# Patient Record
Sex: MALE | Race: BLACK OR AFRICAN AMERICAN | NOT HISPANIC OR LATINO
[De-identification: names, ages, dates, MRNs, and addresses within clinical notes are randomized per-mention and may not be internally consistent; named-entity substitution may affect disease eponyms.]

---

## 2019-10-09 PROBLEM — Z00.00 ENCOUNTER FOR PREVENTIVE HEALTH EXAMINATION: Status: ACTIVE | Noted: 2019-10-09

## 2019-10-17 ENCOUNTER — NON-APPOINTMENT (OUTPATIENT)
Age: 59
End: 2019-10-17

## 2019-10-17 ENCOUNTER — APPOINTMENT (OUTPATIENT)
Dept: CARDIOLOGY | Facility: CLINIC | Age: 59
End: 2019-10-17
Payer: COMMERCIAL

## 2019-10-17 VITALS
HEIGHT: 68 IN | HEART RATE: 84 BPM | BODY MASS INDEX: 29.7 KG/M2 | OXYGEN SATURATION: 97 % | DIASTOLIC BLOOD PRESSURE: 70 MMHG | WEIGHT: 196 LBS | SYSTOLIC BLOOD PRESSURE: 118 MMHG | RESPIRATION RATE: 16 BRPM

## 2019-10-17 DIAGNOSIS — I10 ESSENTIAL (PRIMARY) HYPERTENSION: ICD-10-CM

## 2019-10-17 DIAGNOSIS — R06.00 DYSPNEA, UNSPECIFIED: ICD-10-CM

## 2019-10-17 DIAGNOSIS — R07.9 CHEST PAIN, UNSPECIFIED: ICD-10-CM

## 2019-10-17 DIAGNOSIS — E78.5 HYPERLIPIDEMIA, UNSPECIFIED: ICD-10-CM

## 2019-10-17 DIAGNOSIS — Z82.49 FAMILY HISTORY OF ISCHEMIC HEART DISEASE AND OTHER DISEASES OF THE CIRCULATORY SYSTEM: ICD-10-CM

## 2019-10-17 DIAGNOSIS — Z78.9 OTHER SPECIFIED HEALTH STATUS: ICD-10-CM

## 2019-10-17 PROCEDURE — 93000 ELECTROCARDIOGRAM COMPLETE: CPT

## 2019-10-17 PROCEDURE — 99204 OFFICE O/P NEW MOD 45 MIN: CPT

## 2019-10-17 RX ORDER — TAMSULOSIN HYDROCHLORIDE 0.4 MG/1
0.4 CAPSULE ORAL TWICE DAILY
Refills: 3 | Status: ACTIVE | COMMUNITY

## 2019-10-17 RX ORDER — ASPIRIN 81 MG
81 TABLET, DELAYED RELEASE (ENTERIC COATED) ORAL
Refills: 0 | Status: ACTIVE | COMMUNITY

## 2019-10-17 RX ORDER — AMLODIPINE BESYLATE 10 MG/1
10 TABLET ORAL
Refills: 0 | Status: ACTIVE | COMMUNITY

## 2019-10-17 RX ORDER — MULTIVIT-MIN/FA/LYCOPEN/LUTEIN .4-300-25
TABLET ORAL
Refills: 0 | Status: ACTIVE | COMMUNITY

## 2019-10-17 RX ORDER — PRAVASTATIN SODIUM 40 MG/1
40 TABLET ORAL
Refills: 0 | Status: ACTIVE | COMMUNITY

## 2019-10-17 RX ORDER — UBIDECARENONE/VIT E ACET 100MG-5
CAPSULE ORAL
Refills: 0 | Status: ACTIVE | COMMUNITY

## 2019-10-17 NOTE — PHYSICAL EXAM
[General Appearance - Well Developed] : well developed [General Appearance - Well Nourished] : well nourished [Normal Conjunctiva] : the conjunctiva exhibited no abnormalities [Normal Oropharynx] : normal oropharynx [Normal Jugular Venous V Waves Present] : normal jugular venous V waves present [Heart Rate And Rhythm] : heart rate and rhythm were normal [Heart Sounds] : normal S1 and S2 [Murmurs] : no murmurs present [] : no respiratory distress [Respiration, Rhythm And Depth] : normal respiratory rhythm and effort [Auscultation Breath Sounds / Voice Sounds] : lungs were clear to auscultation bilaterally [Bowel Sounds] : normal bowel sounds [Abdomen Soft] : soft [Abdomen Tenderness] : non-tender [Abnormal Walk] : normal gait [Nail Clubbing] : no clubbing of the fingernails [Cyanosis, Localized] : no localized cyanosis [Skin Turgor] : normal skin turgor [Skin Color & Pigmentation] : normal skin color and pigmentation [Oriented To Time, Place, And Person] : oriented to person, place, and time [Affect] : the affect was normal [FreeTextEntry1] : no edema

## 2019-10-17 NOTE — HISTORY OF PRESENT ILLNESS
[FreeTextEntry1] : Patient is a 60yo M with HTN, HLD, family h/o CAD here for cardiac evaluation. Seen cardiology few years back, now changed due to insurance. NOt , has grown kids. Live with family. Patient denies PND/orthopnea/edema/palpitations/syncope/claudication. Will get sharp chest pain, drinks water and deep breaths and gets better. COmes sporadically. Lasts seconds. Will get SOB going up stairs sometimes. \par \par ROS: GI negative, all others negative

## 2019-10-17 NOTE — DISCUSSION/SUMMARY
[FreeTextEntry1] : Patient is a 58yo M with HTN, HLD, family h/o CAD here for cardiac evaluation. Has some atypical CP and mild dypsnea on exertion. Exam benign. ECG with minor abnormalities. WIll obtain BW to review lipids, BP controlled. Will arrange ischemic work up\par \par 1. ECho and nuclear stress test to evaluate dypsnea/CP in setting of family history/HTN/HLD \par 2. Continue current antihypertensives, blood pressure is well controlled \par 3. Obtain lipids, continue statin. CAn continue ASA\par 4. Follow up after testing

## 2019-11-20 ENCOUNTER — INPATIENT (INPATIENT)
Facility: HOSPITAL | Age: 59
LOS: 1 days | Discharge: HOME | DRG: 690 | End: 2019-11-22
Attending: GENERAL ACUTE CARE HOSPITAL | Admitting: HOSPITALIST
Payer: COMMERCIAL

## 2019-11-20 VITALS
HEART RATE: 96 BPM | TEMPERATURE: 99 F | SYSTOLIC BLOOD PRESSURE: 118 MMHG | HEIGHT: 68 IN | WEIGHT: 194.01 LBS | RESPIRATION RATE: 18 BRPM | DIASTOLIC BLOOD PRESSURE: 80 MMHG | OXYGEN SATURATION: 98 %

## 2019-11-20 DIAGNOSIS — A49.9 BACTERIAL INFECTION, UNSPECIFIED: ICD-10-CM

## 2019-11-20 LAB
ALBUMIN SERPL ELPH-MCNC: 3.4 G/DL — SIGNIFICANT CHANGE UP (ref 3.3–5.2)
ALP SERPL-CCNC: 111 U/L — SIGNIFICANT CHANGE UP (ref 40–120)
ALT FLD-CCNC: 46 U/L — HIGH
ANION GAP SERPL CALC-SCNC: 11 MMOL/L — SIGNIFICANT CHANGE UP (ref 5–17)
APPEARANCE UR: CLEAR — SIGNIFICANT CHANGE UP
AST SERPL-CCNC: 38 U/L — SIGNIFICANT CHANGE UP
BACTERIA # UR AUTO: NEGATIVE — SIGNIFICANT CHANGE UP
BASOPHILS # BLD AUTO: 0 K/UL — SIGNIFICANT CHANGE UP (ref 0–0.2)
BASOPHILS NFR BLD AUTO: 0 % — SIGNIFICANT CHANGE UP (ref 0–2)
BILIRUB SERPL-MCNC: 1 MG/DL — SIGNIFICANT CHANGE UP (ref 0.4–2)
BILIRUB UR-MCNC: NEGATIVE — SIGNIFICANT CHANGE UP
BUN SERPL-MCNC: 15 MG/DL — SIGNIFICANT CHANGE UP (ref 8–20)
CALCIUM SERPL-MCNC: 9 MG/DL — SIGNIFICANT CHANGE UP (ref 8.6–10.2)
CHLORIDE SERPL-SCNC: 97 MMOL/L — LOW (ref 98–107)
CO2 SERPL-SCNC: 25 MMOL/L — SIGNIFICANT CHANGE UP (ref 22–29)
COLOR SPEC: YELLOW — SIGNIFICANT CHANGE UP
CREAT SERPL-MCNC: 0.87 MG/DL — SIGNIFICANT CHANGE UP (ref 0.5–1.3)
DIFF PNL FLD: ABNORMAL
EOSINOPHIL # BLD AUTO: 0.13 K/UL — SIGNIFICANT CHANGE UP (ref 0–0.5)
EOSINOPHIL NFR BLD AUTO: 0.9 % — SIGNIFICANT CHANGE UP (ref 0–6)
EPI CELLS # UR: SIGNIFICANT CHANGE UP
GLUCOSE SERPL-MCNC: 114 MG/DL — SIGNIFICANT CHANGE UP (ref 70–115)
GLUCOSE UR QL: NEGATIVE MG/DL — SIGNIFICANT CHANGE UP
HCT VFR BLD CALC: 33.4 % — LOW (ref 39–50)
HGB BLD-MCNC: 10.3 G/DL — LOW (ref 13–17)
KETONES UR-MCNC: ABNORMAL
LEUKOCYTE ESTERASE UR-ACNC: ABNORMAL
LYMPHOCYTES # BLD AUTO: 0.97 K/UL — LOW (ref 1–3.3)
LYMPHOCYTES # BLD AUTO: 6.9 % — LOW (ref 13–44)
MCHC RBC-ENTMCNC: 25.8 PG — LOW (ref 27–34)
MCHC RBC-ENTMCNC: 30.8 GM/DL — LOW (ref 32–36)
MCV RBC AUTO: 83.7 FL — SIGNIFICANT CHANGE UP (ref 80–100)
MONOCYTES # BLD AUTO: 0.6 K/UL — SIGNIFICANT CHANGE UP (ref 0–0.9)
MONOCYTES NFR BLD AUTO: 4.3 % — SIGNIFICANT CHANGE UP (ref 2–14)
NEUTROPHILS # BLD AUTO: 12.34 K/UL — HIGH (ref 1.8–7.4)
NEUTROPHILS NFR BLD AUTO: 87 % — HIGH (ref 43–77)
NITRITE UR-MCNC: NEGATIVE — SIGNIFICANT CHANGE UP
PH UR: 6 — SIGNIFICANT CHANGE UP (ref 5–8)
PLATELET # BLD AUTO: 266 K/UL — SIGNIFICANT CHANGE UP (ref 150–400)
POTASSIUM SERPL-MCNC: 4.2 MMOL/L — SIGNIFICANT CHANGE UP (ref 3.5–5.3)
POTASSIUM SERPL-SCNC: 4.2 MMOL/L — SIGNIFICANT CHANGE UP (ref 3.5–5.3)
PROT SERPL-MCNC: 7 G/DL — SIGNIFICANT CHANGE UP (ref 6.6–8.7)
PROT UR-MCNC: 30 MG/DL
RBC # BLD: 3.99 M/UL — LOW (ref 4.2–5.8)
RBC # FLD: 14.1 % — SIGNIFICANT CHANGE UP (ref 10.3–14.5)
RBC CASTS # UR COMP ASSIST: SIGNIFICANT CHANGE UP /HPF (ref 0–4)
SODIUM SERPL-SCNC: 133 MMOL/L — LOW (ref 135–145)
SP GR SPEC: 1.02 — SIGNIFICANT CHANGE UP (ref 1.01–1.02)
UROBILINOGEN FLD QL: NEGATIVE MG/DL — SIGNIFICANT CHANGE UP
WBC # BLD: 14.04 K/UL — HIGH (ref 3.8–10.5)
WBC # FLD AUTO: 14.04 K/UL — HIGH (ref 3.8–10.5)
WBC UR QL: SIGNIFICANT CHANGE UP

## 2019-11-20 PROCEDURE — 99284 EMERGENCY DEPT VISIT MOD MDM: CPT

## 2019-11-20 PROCEDURE — 76775 US EXAM ABDO BACK WALL LIM: CPT | Mod: 26

## 2019-11-20 PROCEDURE — 99222 1ST HOSP IP/OBS MODERATE 55: CPT

## 2019-11-20 RX ORDER — TAMSULOSIN HYDROCHLORIDE 0.4 MG/1
1 CAPSULE ORAL
Qty: 0 | Refills: 0 | DISCHARGE

## 2019-11-20 RX ORDER — MEROPENEM 1 G/30ML
1000 INJECTION INTRAVENOUS EVERY 8 HOURS
Refills: 0 | Status: DISCONTINUED | OUTPATIENT
Start: 2019-11-20 | End: 2019-11-21

## 2019-11-20 RX ORDER — AMLODIPINE BESYLATE 2.5 MG/1
10 TABLET ORAL DAILY
Refills: 0 | Status: DISCONTINUED | OUTPATIENT
Start: 2019-11-20 | End: 2019-11-22

## 2019-11-20 RX ORDER — ACETAMINOPHEN 500 MG
650 TABLET ORAL EVERY 6 HOURS
Refills: 0 | Status: DISCONTINUED | OUTPATIENT
Start: 2019-11-20 | End: 2019-11-22

## 2019-11-20 RX ORDER — ATORVASTATIN CALCIUM 80 MG/1
10 TABLET, FILM COATED ORAL AT BEDTIME
Refills: 0 | Status: DISCONTINUED | OUTPATIENT
Start: 2019-11-20 | End: 2019-11-22

## 2019-11-20 RX ORDER — MEROPENEM 1 G/30ML
1000 INJECTION INTRAVENOUS ONCE
Refills: 0 | Status: COMPLETED | OUTPATIENT
Start: 2019-11-20 | End: 2019-11-20

## 2019-11-20 RX ORDER — TAMSULOSIN HYDROCHLORIDE 0.4 MG/1
0.4 CAPSULE ORAL AT BEDTIME
Refills: 0 | Status: DISCONTINUED | OUTPATIENT
Start: 2019-11-20 | End: 2019-11-22

## 2019-11-20 RX ORDER — MEROPENEM 1 G/30ML
1 INJECTION INTRAVENOUS ONCE
Refills: 0 | Status: DISCONTINUED | OUTPATIENT
Start: 2019-11-20 | End: 2019-11-20

## 2019-11-20 RX ORDER — AMLODIPINE BESYLATE 2.5 MG/1
1 TABLET ORAL
Qty: 0 | Refills: 0 | DISCHARGE

## 2019-11-20 RX ADMIN — MEROPENEM 100 MILLIGRAM(S): 1 INJECTION INTRAVENOUS at 18:48

## 2019-11-20 NOTE — ED PROVIDER NOTE - NS ED ROS FT
Constitutional: positive fevers, no sweats, and no chills.   Genitourinary: Positive dysuria, intermittent retention, no hematuria, no flank pain, no pelvis tenderness   Gastrointestinal: No bloating, no constipation   Cardiac: No chest pain, no palpitations   Respiratory: No shortness of breath Constitutional: positive fevers, no sweats, and no chills.   Genitourinary: Positive dysuria, intermittent retention, no hematuria, no flank pain, no pelvis tenderness   Gastrointestinal: No bloating, no constipation   Cardiac: No chest pain, no palpitations   Respiratory: No shortness of breath, no dyspnea

## 2019-11-20 NOTE — H&P ADULT - ASSESSMENT
59yoM hx HTN, HLD, BPH recently diagnosed with UTI at urgent care center, started on ciprofloxacin, sent to ED after urine culture revealed EBSL    ESBL UTI  -Urine culture from urgent care center located on patient’s cell phone  -Admit to any medical bed  -UA negative, but likely sterile in setting of recent quinolone use  -Received meropenem in ED, will continue meropenem   -Urine cultures pending  -Tylenol PRN  -Pt will need PICC placement, case management consult to facilitate IV abx infusions at home     Leukocytosis  -WBC 14, likely due to UTI  -Trend CBC    Normocytic anemia  -Check iron studies, vit B12, and folic acid in AM  -Monitor CBC    HTN  -Amlodipine resumed    HLD  -Statin resumed    BPH  -Tamsulosin resumed     Prophylactic measure  -Intermittent pneumatic compression

## 2019-11-20 NOTE — H&P ADULT - HISTORY OF PRESENT ILLNESS
59yoM hx HTN, HLD, BPH who initially presented with intermittent urinary retention and urinary incontinence associated with subjective fevers and chills beginning last week.  His symptoms persisted, then he began to have to have urinary frequency and urgency which prompted him to go to an urgent care center where he was diagnosed with UTI and started on ciprofloxacin.  He received a call by the urgent care center earlier today advising him to go to the hospital after his urine culture was positive for ESBL.  Pt has a copy for the urine culture report on his cellphone which shows 60K ESBL. Pt reports that his urinary retention symptoms have been improving.  While in ED, he had a renal US that was negative for hydronephrosis.

## 2019-11-20 NOTE — ED STATDOCS - PROGRESS NOTE DETAILS
60 y/o M pt with hx of BPH presents to ED c/o urinary incontinence and increased urgency. Reports incontinence over the past few days. Presented to urgent care 2 days ago with 100.8 F for same complaint; had urine culture collected. Today received a phone call and was told to present to ED for IV abx due to + culture for ESBL. Patient has copy of results. No pain. No bowel incontinence. No N/V/D. will be transferred to [facility].

## 2019-11-20 NOTE — ED PROVIDER NOTE - OBJECTIVE STATEMENT
59 year old male who presents to the ED with intermittent urinary incontinence and retention. Patient stated last week he began to have a fever that he initially thought was the flu. The fever went away and returned this past monday associated with urinary frequency, urgency and inability to fully empty his bladder. 59 year old male who presents to the ED with intermittent urinary incontinence and retention. Patient stated last week he began to have a fever that he initially thought was the flu. The fever went away and returned this past Monday 100.0 F he did not take anything for but had associated symptoms of urinary frequency, urgency and inability to fully empty his bladder. Patient went to urgent care for his symptoms and they told him he had a UTI and put him on 500mg ciprofloxacin BID. Patient has been taking antibiotic as instructed and received a phone call today from urgent care "to go to the ED for fluids because he was dehydrated". Patient reports as of today the urinary frequency is subsiding but now he does not feel as if he is fully emptying his bladder. Denies fever since Monday. Results from urgent care show urine culture positive for ESBL E.Coli

## 2019-11-20 NOTE — ED STATDOCS - NS ED ROS FT
No fever/chills, No photophobia/eye pain/changes in vision, No ear pain/sore throat/dysphagia, No chest pain/palpitations, no SOB/cough/wheeze/stridor, No abdominal pain, No N/V/D, + urinary incontinence. no dysuria/discharge, No neck/back pain, no rash, no changes in neurological status/function.

## 2019-11-20 NOTE — ED PROVIDER NOTE - CLINICAL SUMMARY MEDICAL DECISION MAKING FREE TEXT BOX
Patient presents to ED with 3 days of dysuria and urinary frequency. Patient has results from urgent care with ECOLI ESBL urine culture. Will obtain urine cultures, labs, renal and bladder ultrasound. Will require IV antibiotics

## 2019-11-20 NOTE — ED STATDOCS - OBJECTIVE STATEMENT
58 y/o M pt with hx of BPH presents to ED c/o urinary incontinence and increased urgency. Reports incontinence over the past few days. Presented to urgent care 2 days ago for same complaint; today received a phone call and was told to present to ED for "fluids". No pain. No bowel incontinence. No N/V/D. No further complaints at this time.

## 2019-11-20 NOTE — H&P ADULT - NSHPLABSRESULTS_GEN_ALL_CORE
10.3   14.04 )-----------( 266      ( 20 Nov 2019 18:39 )             33.4       11-20    133<L>  |  97<L>  |  15.0  ----------------------------<  114  4.2   |  25.0  |  0.87    Ca    9.0      20 Nov 2019 18:39    TPro  7.0  /  Alb  3.4  /  TBili  1.0  /  DBili  x   /  AST  38  /  ALT  46<H>  /  AlkPhos  111  11-20

## 2019-11-20 NOTE — H&P ADULT - NSICDXPASTMEDICALHX_GEN_ALL_CORE_FT
PAST MEDICAL HISTORY:  Benign prostatic hyperplasia without lower urinary tract symptoms     High cholesterol     Hypertension, unspecified type

## 2019-11-20 NOTE — H&P ADULT - NSHPPHYSICALEXAM_GEN_ALL_CORE
Vital Signs Last 24 Hrs  T(C): 37 (20 Nov 2019 19:53), Max: 37.1 (20 Nov 2019 15:08)  T(F): 98.6 (20 Nov 2019 19:53), Max: 98.7 (20 Nov 2019 15:08)  HR: 92 (20 Nov 2019 19:53) (92 - 96)  BP: 123/78 (20 Nov 2019 19:53) (118/80 - 123/78)  BP(mean): --  RR: 18 (20 Nov 2019 19:53) (18 - 18)  SpO2: 96% (20 Nov 2019 19:53) (96% - 98%)    GENERAL:  Well-appearing, not in acute distress  EYES:  Clear conjunctiva, extraocular movement intact  ENT: Moist mucous membranes  RESP:  Non-labored breathing pattern, lungs clear to ausculation in anterior fields  CV: Regular rate and rhythm, no murmurs appreciated, no lower extremity edema  GI: Soft, non-tender, non-distended  NEURO: Awake, alert, conversant, non-focal  PSYCH: Calm, cooperative  SKIN: No rash or lesions, warm and dry

## 2019-11-20 NOTE — ED PROVIDER NOTE - PMH
Benign prostatic hyperplasia without lower urinary tract symptoms    High cholesterol    Hypertension, unspecified type

## 2019-11-21 LAB
ANION GAP SERPL CALC-SCNC: 12 MMOL/L — SIGNIFICANT CHANGE UP (ref 5–17)
BASOPHILS # BLD AUTO: 0.01 K/UL — SIGNIFICANT CHANGE UP (ref 0–0.2)
BASOPHILS NFR BLD AUTO: 0.1 % — SIGNIFICANT CHANGE UP (ref 0–2)
BUN SERPL-MCNC: 14 MG/DL — SIGNIFICANT CHANGE UP (ref 8–20)
CALCIUM SERPL-MCNC: 8.5 MG/DL — LOW (ref 8.6–10.2)
CHLORIDE SERPL-SCNC: 104 MMOL/L — SIGNIFICANT CHANGE UP (ref 98–107)
CO2 SERPL-SCNC: 23 MMOL/L — SIGNIFICANT CHANGE UP (ref 22–29)
CREAT SERPL-MCNC: 0.82 MG/DL — SIGNIFICANT CHANGE UP (ref 0.5–1.3)
CULTURE RESULTS: NO GROWTH — SIGNIFICANT CHANGE UP
EOSINOPHIL # BLD AUTO: 0.03 K/UL — SIGNIFICANT CHANGE UP (ref 0–0.5)
EOSINOPHIL NFR BLD AUTO: 0.3 % — SIGNIFICANT CHANGE UP (ref 0–6)
FERRITIN SERPL-MCNC: 346 NG/ML — SIGNIFICANT CHANGE UP (ref 30–400)
FOLATE SERPL-MCNC: 16.4 NG/ML — SIGNIFICANT CHANGE UP
GLUCOSE SERPL-MCNC: 98 MG/DL — SIGNIFICANT CHANGE UP (ref 70–115)
HCT VFR BLD CALC: 33 % — LOW (ref 39–50)
HCV AB S/CO SERPL IA: 0.11 S/CO — SIGNIFICANT CHANGE UP (ref 0–0.99)
HCV AB SERPL-IMP: SIGNIFICANT CHANGE UP
HGB BLD-MCNC: 10.5 G/DL — LOW (ref 13–17)
IMM GRANULOCYTES NFR BLD AUTO: 1.9 % — HIGH (ref 0–1.5)
IRON SATN MFR SERPL: 21 % — SIGNIFICANT CHANGE UP (ref 16–55)
IRON SATN MFR SERPL: 38 UG/DL — LOW (ref 59–158)
LYMPHOCYTES # BLD AUTO: 0.9 K/UL — LOW (ref 1–3.3)
LYMPHOCYTES # BLD AUTO: 10.2 % — LOW (ref 13–44)
MCHC RBC-ENTMCNC: 26.4 PG — LOW (ref 27–34)
MCHC RBC-ENTMCNC: 31.8 GM/DL — LOW (ref 32–36)
MCV RBC AUTO: 82.9 FL — SIGNIFICANT CHANGE UP (ref 80–100)
MONOCYTES # BLD AUTO: 1.02 K/UL — HIGH (ref 0–0.9)
MONOCYTES NFR BLD AUTO: 11.6 % — SIGNIFICANT CHANGE UP (ref 2–14)
NEUTROPHILS # BLD AUTO: 6.69 K/UL — SIGNIFICANT CHANGE UP (ref 1.8–7.4)
NEUTROPHILS NFR BLD AUTO: 75.9 % — SIGNIFICANT CHANGE UP (ref 43–77)
PLATELET # BLD AUTO: 260 K/UL — SIGNIFICANT CHANGE UP (ref 150–400)
POTASSIUM SERPL-MCNC: 4 MMOL/L — SIGNIFICANT CHANGE UP (ref 3.5–5.3)
POTASSIUM SERPL-SCNC: 4 MMOL/L — SIGNIFICANT CHANGE UP (ref 3.5–5.3)
RBC # BLD: 3.98 M/UL — LOW (ref 4.2–5.8)
RBC # FLD: 13.9 % — SIGNIFICANT CHANGE UP (ref 10.3–14.5)
SODIUM SERPL-SCNC: 139 MMOL/L — SIGNIFICANT CHANGE UP (ref 135–145)
SPECIMEN SOURCE: SIGNIFICANT CHANGE UP
TIBC SERPL-MCNC: 177 UG/DL — LOW (ref 220–430)
TRANSFERRIN SERPL-MCNC: 124 MG/DL — LOW (ref 180–329)
VIT B12 SERPL-MCNC: 1325 PG/ML — HIGH (ref 232–1245)
WBC # BLD: 8.82 K/UL — SIGNIFICANT CHANGE UP (ref 3.8–10.5)
WBC # FLD AUTO: 8.82 K/UL — SIGNIFICANT CHANGE UP (ref 3.8–10.5)

## 2019-11-21 PROCEDURE — 99232 SBSQ HOSP IP/OBS MODERATE 35: CPT

## 2019-11-21 PROCEDURE — 99222 1ST HOSP IP/OBS MODERATE 55: CPT

## 2019-11-21 RX ORDER — MEROPENEM 1 G/30ML
1000 INJECTION INTRAVENOUS EVERY 8 HOURS
Refills: 0 | Status: DISCONTINUED | OUTPATIENT
Start: 2019-11-21 | End: 2019-11-22

## 2019-11-21 RX ORDER — MEROPENEM 1 G/30ML
500 INJECTION INTRAVENOUS EVERY 8 HOURS
Refills: 0 | Status: DISCONTINUED | OUTPATIENT
Start: 2019-11-21 | End: 2019-11-21

## 2019-11-21 RX ORDER — IRON SUCROSE 20 MG/ML
200 INJECTION, SOLUTION INTRAVENOUS EVERY 24 HOURS
Refills: 0 | Status: DISCONTINUED | OUTPATIENT
Start: 2019-11-21 | End: 2019-11-22

## 2019-11-21 RX ORDER — SACCHAROMYCES BOULARDII 250 MG
250 POWDER IN PACKET (EA) ORAL
Refills: 0 | Status: DISCONTINUED | OUTPATIENT
Start: 2019-11-21 | End: 2019-11-22

## 2019-11-21 RX ORDER — ENOXAPARIN SODIUM 100 MG/ML
40 INJECTION SUBCUTANEOUS DAILY
Refills: 0 | Status: DISCONTINUED | OUTPATIENT
Start: 2019-11-21 | End: 2019-11-22

## 2019-11-21 RX ORDER — ERTAPENEM SODIUM 1 G/1
1000 INJECTION, POWDER, LYOPHILIZED, FOR SOLUTION INTRAMUSCULAR; INTRAVENOUS EVERY 24 HOURS
Refills: 0 | Status: DISCONTINUED | OUTPATIENT
Start: 2019-11-21 | End: 2019-11-21

## 2019-11-21 RX ADMIN — Medication 250 MILLIGRAM(S): at 15:00

## 2019-11-21 RX ADMIN — IRON SUCROSE 110 MILLIGRAM(S): 20 INJECTION, SOLUTION INTRAVENOUS at 23:40

## 2019-11-21 RX ADMIN — MEROPENEM 100 MILLIGRAM(S): 1 INJECTION INTRAVENOUS at 22:30

## 2019-11-21 RX ADMIN — TAMSULOSIN HYDROCHLORIDE 0.4 MILLIGRAM(S): 0.4 CAPSULE ORAL at 22:30

## 2019-11-21 RX ADMIN — AMLODIPINE BESYLATE 10 MILLIGRAM(S): 2.5 TABLET ORAL at 05:35

## 2019-11-21 RX ADMIN — ERTAPENEM SODIUM 120 MILLIGRAM(S): 1 INJECTION, POWDER, LYOPHILIZED, FOR SOLUTION INTRAMUSCULAR; INTRAVENOUS at 15:00

## 2019-11-21 RX ADMIN — MEROPENEM 100 MILLIGRAM(S): 1 INJECTION INTRAVENOUS at 05:35

## 2019-11-21 RX ADMIN — ATORVASTATIN CALCIUM 10 MILLIGRAM(S): 80 TABLET, FILM COATED ORAL at 22:30

## 2019-11-21 NOTE — CONSULT NOTE ADULT - SUBJECTIVE AND OBJECTIVE BOX
[facility]  INFECTIOUS DISEASES AND INTERNAL MEDICINE at [place]  =======================================================  Rafat Hanson MD  Diplomates American Board of Internal Medicine and Infectious Diseases  =======================================================    MRN-[id number]  NASIR HERNANDEZ     CC: ESBL UTI     HPI:  58y/o man with PMH of HTN and BPH was admitted on  with intermittent urinary retention and urinary incontinence associated with fevers and chills for one week. He also had urinary frequency and urgency, seen in urgent care and had UA and UC and discharged with ciprofloxacin. When UC came back ESBL Ecoli he was called back to ED for IV antibiotics treatment.   His symptoms are improving, no fever. No nausea, vomiting, abdominal or flank pain.     PAST MEDICAL & SURGICAL HISTORY:  Benign prostatic hyperplasia without lower urinary tract symptoms  High cholesterol  Hypertension, unspecified type  No significant past surgical history    Social Hx: No smoking, or drugs, drinks once in a while.      FAMILY HISTORY:  FH: hyperlipidemia    Allergies  No Known Allergies    Antibiotics:  ertapenem  IVPB 1000 milliGRAM(s) IV Intermittent every 24 hours     REVIEW OF SYSTEMS:  CONSTITUTIONAL:  No Fever or chills  HEENT:  No diplopia or blurred vision.  No sore throat or runny nose.  CARDIOVASCULAR:  No chest pain or SOB.  RESPIRATORY:  No cough, shortness of breath, PND or orthopnea.  GASTROINTESTINAL:  No nausea, vomiting or diarrhea.  GENITOURINARY:  + dysuria, frequency and urgency.   MUSCULOSKELETAL:  no joint aches, no muscle pain  SKIN:  No change in skin, hair or nails.  NEUROLOGIC:  No paresthesias, fasciculations, seizures or weakness.  PSYCHIATRIC:  No disorder of thought or mood.  ENDOCRINE:  No heat or cold intolerance, polyuria or polydipsia.  HEMATOLOGICAL:  No easy bruising or bleeding.     Physical Exam:  Vital Signs Last 24 Hrs  T(C): 37.6 (2019 16:05), Max: 37.6 (2019 16:05)  T(F): 99.6 (2019 16:05), Max: 99.6 (2019 16:05)  HR: 84 (2019 16:05) (84 - 92)  BP: 123/80 (2019 16:05) (117/78 - 123/80)  RR: 16 (2019 16:05) (16 - 18)  SpO2: 100% (2019 16:05) (96% - 100%)  GEN: NAD  HEENT: normocephalic and atraumatic. EOMI. PERRL.    NECK: Supple.  No lymphadenopathy   LUNGS: Clear to auscultation.  HEART: Regular rate and rhythm 2/6 systolic murmur.  ABDOMEN: Soft, nontender, and nondistended.  Positive bowel sounds.    : No CVA tenderness  EXTREMITIES: Without any cyanosis, clubbing, rash, lesions or edema.  NEUROLOGIC: grossly intact.  PSYCHIATRIC: Appropriate affect .  SKIN: No ulceration or induration present.    Labs:      139  |  104  |  14.0  ----------------------------<  98  4.0   |  23.0  |  0.82    Ca    8.5<L>      2019 07:17    TPro  7.0  /  Alb  3.4  /  TBili  1.0  /  DBili  x   /  AST  38  /  ALT  46<H>  /  AlkPhos  111                          10.5   8.82  )-----------( 260      ( 2019 07:17 )             33.0     Urinalysis Basic - ( 2019 17:24 )    Color: Yellow / Appearance: Clear / S.020 / pH: x  Gluc: x / Ketone: Trace  / Bili: Negative / Urobili: Negative mg/dL   Blood: x / Protein: 30 mg/dL / Nitrite: Negative   Leuk Esterase: Trace / RBC: 0-2 /HPF / WBC 0-2   Sq Epi: x / Non Sq Epi: Occasional / Bacteria: Negative    LIVER FUNCTIONS - ( 2019 18:39 )  Alb: 3.4 g/dL / Pro: 7.0 g/dL / ALK PHOS: 111 U/L / ALT: 46 U/L / AST: 38 U/L / GGT: x           RECENT CULTURES:   @ 17:24 .Urine     No growth    All imaging and other data have been reviewed.      Assessment and Plan:   58y/o man with PMH of HTN and BPH was admitted on  with intermittent urinary retention and urinary incontinence associated with fevers and chills for one week. He also had urinary frequency and urgency, seen in urgent care and had UA and UC and discharged with ciprofloxacin. When UC came back ESBL Ecoli he was called back to ED for IV antibiotics treatment.     ESBL ecoli UTI  BPH and urinary retention    - UA and UC negative in [facility]  - ESBL ecoli in UC 60,000 in urgent care but symptomatic  - Will start him on meropenem 1gm q8h   - Will try ot obtain sensitivity of Ecoli, if sensitive to cipro, bactrim or nitrofurantoin then can be switched to oral.  - Renal USG to rule out structural abnormalities  -  referral after discharge.    - Trend WBC from 14k-->8k    Will follow. [facility]  INFECTIOUS DISEASES AND INTERNAL MEDICINE at [place]  =======================================================  Rafat Hanson MD  Diplomates American Board of Internal Medicine and Infectious Diseases  =======================================================    MRN-[id number]  NASIR HERNANDEZ     CC: ESBL UTI     HPI:  60y/o man with PMH of HTN and BPH was admitted on  with intermittent urinary retention and urinary incontinence associated with fevers and chills for one week. He also had urinary frequency and urgency, seen in urgent care and had UA and UC and discharged with ciprofloxacin. When UC came back ESBL Ecoli he was called back to ED for IV antibiotics treatment.   His symptoms are improving, no fever. No nausea, vomiting, abdominal or flank pain.     PAST MEDICAL & SURGICAL HISTORY:  Benign prostatic hyperplasia without lower urinary tract symptoms  High cholesterol  Hypertension, unspecified type  No significant past surgical history    Social Hx: No smoking, or drugs, drinks once in a while.      FAMILY HISTORY:  FH: hyperlipidemia    Allergies  No Known Allergies    Antibiotics:  ertapenem  IVPB 1000 milliGRAM(s) IV Intermittent every 24 hours     REVIEW OF SYSTEMS:  CONSTITUTIONAL:  No Fever or chills  HEENT:  No diplopia or blurred vision.  No sore throat or runny nose.  CARDIOVASCULAR:  No chest pain or SOB.  RESPIRATORY:  No cough, shortness of breath, PND or orthopnea.  GASTROINTESTINAL:  No nausea, vomiting or diarrhea.  GENITOURINARY:  + dysuria, frequency and urgency.   MUSCULOSKELETAL:  no joint aches, no muscle pain  SKIN:  No change in skin, hair or nails.  NEUROLOGIC:  No paresthesias, fasciculations, seizures or weakness.  PSYCHIATRIC:  No disorder of thought or mood.  ENDOCRINE:  No heat or cold intolerance, polyuria or polydipsia.  HEMATOLOGICAL:  No easy bruising or bleeding.     Physical Exam:  Vital Signs Last 24 Hrs  T(C): 37.6 (2019 16:05), Max: 37.6 (2019 16:05)  T(F): 99.6 (2019 16:05), Max: 99.6 (2019 16:05)  HR: 84 (2019 16:05) (84 - 92)  BP: 123/80 (2019 16:05) (117/78 - 123/80)  RR: 16 (2019 16:05) (16 - 18)  SpO2: 100% (2019 16:05) (96% - 100%)  GEN: NAD  HEENT: normocephalic and atraumatic. EOMI. PERRL.    NECK: Supple.  No lymphadenopathy   LUNGS: Clear to auscultation.  HEART: Regular rate and rhythm 2/6 systolic murmur.  ABDOMEN: Soft, nontender, and nondistended.  Positive bowel sounds.    : No CVA tenderness  EXTREMITIES: Without any cyanosis, clubbing, rash, lesions or edema.  NEUROLOGIC: grossly intact.  PSYCHIATRIC: Appropriate affect .  SKIN: No ulceration or induration present.    Labs:      139  |  104  |  14.0  ----------------------------<  98  4.0   |  23.0  |  0.82    Ca    8.5<L>      2019 07:17    TPro  7.0  /  Alb  3.4  /  TBili  1.0  /  DBili  x   /  AST  38  /  ALT  46<H>  /  AlkPhos  111                          10.5   8.82  )-----------( 260      ( 2019 07:17 )             33.0     Urinalysis Basic - ( 2019 17:24 )    Color: Yellow / Appearance: Clear / S.020 / pH: x  Gluc: x / Ketone: Trace  / Bili: Negative / Urobili: Negative mg/dL   Blood: x / Protein: 30 mg/dL / Nitrite: Negative   Leuk Esterase: Trace / RBC: 0-2 /HPF / WBC 0-2   Sq Epi: x / Non Sq Epi: Occasional / Bacteria: Negative    LIVER FUNCTIONS - ( 2019 18:39 )  Alb: 3.4 g/dL / Pro: 7.0 g/dL / ALK PHOS: 111 U/L / ALT: 46 U/L / AST: 38 U/L / GGT: x           RECENT CULTURES:   @ 17:24 .Urine     No growth    All imaging and other data have been reviewed.      Assessment and Plan:   60y/o man with PMH of HTN and BPH was admitted on  with intermittent urinary retention and urinary incontinence associated with fevers and chills for one week. He also had urinary frequency and urgency, seen in urgent care and had UA and UC and discharged with ciprofloxacin. When UC came back ESBL Ecoli he was called back to ED for IV antibiotics treatment.     ESBL ecoli UTI  BPH and urinary retention    - UA and UC negative in [facility]  - ESBL ecoli in UC 60,000 in urgent care but symptomatic  - Will start him on meropenem 1gm q8h   - Will try ot obtain sensitivity of Ecoli, if sensitive to cipro, bactrim or nitrofurantoin then can be switched to oral.  - Renal USG with no retention, hydronephrosis or structural abnormalities  -  referral after discharge.    - Trend WBC from 14k-->8k    Will follow.

## 2019-11-21 NOTE — ED ADULT NURSE REASSESSMENT NOTE - NS ED NURSE REASSESS COMMENT FT1
pt status unchanged, refer to flowsheet and chart, pt safety maintained, pt hemodynamically stable
Patient resting in stretcher, VSS at this time. No s/s of distress noted. Ambulating independently to bathroom as needed, steady gait noted, safety maintained. Awaiting bed placement.
Report received from off-going RN at 19:30, VSS, pt resting in stretcher at this time. No s/s of distress noted. Awaiting dispo at this time, will continue to monitor.

## 2019-11-21 NOTE — PROGRESS NOTE ADULT - SUBJECTIVE AND OBJECTIVE BOX
Bacterial infection      HPI:  59yoM hx HTN, HLD, BPH who initially presented with intermittent urinary retention and urinary incontinence associated with subjective fevers and chills beginning last week.  His symptoms persisted, then he began to have to have urinary frequency and urgency which prompted him to go to an urgent care center where he was diagnosed with UTI and started on ciprofloxacin.  He received a call by the urgent care center earlier today advising him to go to the hospital after his urine culture was positive for ESBL.  Pt has a copy for the urine culture report on his cellphone which shows 60K ESBL. Pt reports that his urinary retention symptoms have been improving.  While in ED, he had a renal US that was negative for hydronephrosis. (2019 23:27)    Interval History:  Patient was seen and examined at bedside around 10:45 am. Feeling better.   Denies urinary symptoms, nausea, vomiting or abdominal pain.    ROS:  As per interval history otherwise unremarkable.    PHYSICAL EXAM:  Vital Signs   T(C): 37.6 (2019 16:05), Max: 37.6 (2019 16:05)  T(F): 99.6 (2019 16:05), Max: 99.6 (2019 16:05)  HR: 84 (2019 16:05) (84 - 92)  BP: 123/80 (2019 16:05) (117/78 - 123/80)  RR: 16 (2019 16:05) (16 - 18)  SpO2: 100% (2019 16:05) (96% - 100%)  General: Well developed. Well nourished. No acute distress  HEENT: EOMI. Clear conjunctivae. Moist mucus membrane  Neck: Supple.   Chest: CTA bilaterally - no wheezing, rales or rhonchi. No chest wall tenderness.  Heart: Normal S1 & S2. RRR.   Abdomen: Soft. Non-tender. Non-distended. + BS  Ext: No pedal edema. No calf tenderness   Back: No CVA tenderness.   Neuro: AAO x 3. No focal deficit. No speech disorder  Skin: Warm and Dry  Psychiatry: Normal mood and affect    MEDICATIONS  (STANDING):  amLODIPine   Tablet 10 milliGRAM(s) Oral daily  atorvastatin 10 milliGRAM(s) Oral at bedtime  meropenem  IVPB 1000 milliGRAM(s) IV Intermittent every 8 hours  saccharomyces boulardii 250 milliGRAM(s) Oral two times a day  tamsulosin 0.4 milliGRAM(s) Oral at bedtime    MEDICATIONS  (PRN):  acetaminophen   Tablet .. 650 milliGRAM(s) Oral every 6 hours PRN Temp greater or equal to 38C (100.4F), Mild Pain (1 - 3), Moderate Pain (4 - 6)    LABS:                        10.5   8.82  )-----------( 260      ( 2019 07:17 )             33.0     11-    139  |  104  |  14.0  ----------------------------<  98  4.0   |  23.0  |  0.82    Ca    8.5<L>      2019 07:17    TPro  7.0  /  Alb  3.4  /  TBili  1.0  /  DBili  x   /  AST  38  /  ALT  46<H>  /  AlkPhos  111  11-20      Urinalysis Basic - ( 2019 17:24 )    Color: Yellow / Appearance: Clear / S.020 / pH: x  Gluc: x / Ketone: Trace  / Bili: Negative / Urobili: Negative mg/dL   Blood: x / Protein: 30 mg/dL / Nitrite: Negative   Leuk Esterase: Trace / RBC: 0-2 /HPF / WBC 0-2   Sq Epi: x / Non Sq Epi: Occasional / Bacteria: Negative    RADIOLOGY & ADDITIONAL STUDIES:  Reviewed

## 2019-11-22 ENCOUNTER — TRANSCRIPTION ENCOUNTER (OUTPATIENT)
Age: 59
End: 2019-11-22

## 2019-11-22 VITALS
OXYGEN SATURATION: 98 % | HEART RATE: 85 BPM | RESPIRATION RATE: 18 BRPM | SYSTOLIC BLOOD PRESSURE: 127 MMHG | TEMPERATURE: 99 F | DIASTOLIC BLOOD PRESSURE: 81 MMHG

## 2019-11-22 LAB
ANION GAP SERPL CALC-SCNC: 13 MMOL/L — SIGNIFICANT CHANGE UP (ref 5–17)
BASOPHILS # BLD AUTO: 0.03 K/UL — SIGNIFICANT CHANGE UP (ref 0–0.2)
BASOPHILS NFR BLD AUTO: 0.5 % — SIGNIFICANT CHANGE UP (ref 0–2)
BUN SERPL-MCNC: 12 MG/DL — SIGNIFICANT CHANGE UP (ref 8–20)
CALCIUM SERPL-MCNC: 9.1 MG/DL — SIGNIFICANT CHANGE UP (ref 8.6–10.2)
CHLORIDE SERPL-SCNC: 103 MMOL/L — SIGNIFICANT CHANGE UP (ref 98–107)
CO2 SERPL-SCNC: 23 MMOL/L — SIGNIFICANT CHANGE UP (ref 22–29)
CREAT SERPL-MCNC: 0.86 MG/DL — SIGNIFICANT CHANGE UP (ref 0.5–1.3)
EOSINOPHIL # BLD AUTO: 0.05 K/UL — SIGNIFICANT CHANGE UP (ref 0–0.5)
EOSINOPHIL NFR BLD AUTO: 0.9 % — SIGNIFICANT CHANGE UP (ref 0–6)
GLUCOSE SERPL-MCNC: 96 MG/DL — SIGNIFICANT CHANGE UP (ref 70–115)
HCT VFR BLD CALC: 34.9 % — LOW (ref 39–50)
HGB BLD-MCNC: 10.8 G/DL — LOW (ref 13–17)
IMM GRANULOCYTES NFR BLD AUTO: 2.8 % — HIGH (ref 0–1.5)
LYMPHOCYTES # BLD AUTO: 1.26 K/UL — SIGNIFICANT CHANGE UP (ref 1–3.3)
LYMPHOCYTES # BLD AUTO: 21.9 % — SIGNIFICANT CHANGE UP (ref 13–44)
MAGNESIUM SERPL-MCNC: 2.2 MG/DL — SIGNIFICANT CHANGE UP (ref 1.6–2.6)
MCHC RBC-ENTMCNC: 25.8 PG — LOW (ref 27–34)
MCHC RBC-ENTMCNC: 30.9 GM/DL — LOW (ref 32–36)
MCV RBC AUTO: 83.3 FL — SIGNIFICANT CHANGE UP (ref 80–100)
MONOCYTES # BLD AUTO: 0.76 K/UL — SIGNIFICANT CHANGE UP (ref 0–0.9)
MONOCYTES NFR BLD AUTO: 13.2 % — SIGNIFICANT CHANGE UP (ref 2–14)
NEUTROPHILS # BLD AUTO: 3.49 K/UL — SIGNIFICANT CHANGE UP (ref 1.8–7.4)
NEUTROPHILS NFR BLD AUTO: 60.7 % — SIGNIFICANT CHANGE UP (ref 43–77)
PLATELET # BLD AUTO: 286 K/UL — SIGNIFICANT CHANGE UP (ref 150–400)
POTASSIUM SERPL-MCNC: 4 MMOL/L — SIGNIFICANT CHANGE UP (ref 3.5–5.3)
POTASSIUM SERPL-SCNC: 4 MMOL/L — SIGNIFICANT CHANGE UP (ref 3.5–5.3)
RBC # BLD: 4.19 M/UL — LOW (ref 4.2–5.8)
RBC # FLD: 14 % — SIGNIFICANT CHANGE UP (ref 10.3–14.5)
SODIUM SERPL-SCNC: 139 MMOL/L — SIGNIFICANT CHANGE UP (ref 135–145)
WBC # BLD: 5.75 K/UL — SIGNIFICANT CHANGE UP (ref 3.8–10.5)
WBC # FLD AUTO: 5.75 K/UL — SIGNIFICANT CHANGE UP (ref 3.8–10.5)

## 2019-11-22 PROCEDURE — 83735 ASSAY OF MAGNESIUM: CPT

## 2019-11-22 PROCEDURE — 80048 BASIC METABOLIC PNL TOTAL CA: CPT

## 2019-11-22 PROCEDURE — 82607 VITAMIN B-12: CPT

## 2019-11-22 PROCEDURE — 83550 IRON BINDING TEST: CPT

## 2019-11-22 PROCEDURE — 87086 URINE CULTURE/COLONY COUNT: CPT

## 2019-11-22 PROCEDURE — 83540 ASSAY OF IRON: CPT

## 2019-11-22 PROCEDURE — 76775 US EXAM ABDO BACK WALL LIM: CPT

## 2019-11-22 PROCEDURE — 82746 ASSAY OF FOLIC ACID SERUM: CPT

## 2019-11-22 PROCEDURE — 96365 THER/PROPH/DIAG IV INF INIT: CPT

## 2019-11-22 PROCEDURE — 99239 HOSP IP/OBS DSCHRG MGMT >30: CPT

## 2019-11-22 PROCEDURE — 80053 COMPREHEN METABOLIC PANEL: CPT

## 2019-11-22 PROCEDURE — 36415 COLL VENOUS BLD VENIPUNCTURE: CPT

## 2019-11-22 PROCEDURE — 99285 EMERGENCY DEPT VISIT HI MDM: CPT | Mod: 25

## 2019-11-22 PROCEDURE — 84466 ASSAY OF TRANSFERRIN: CPT

## 2019-11-22 PROCEDURE — 85027 COMPLETE CBC AUTOMATED: CPT

## 2019-11-22 PROCEDURE — 76857 US EXAM PELVIC LIMITED: CPT

## 2019-11-22 PROCEDURE — 82728 ASSAY OF FERRITIN: CPT

## 2019-11-22 PROCEDURE — 81001 URINALYSIS AUTO W/SCOPE: CPT

## 2019-11-22 PROCEDURE — 99232 SBSQ HOSP IP/OBS MODERATE 35: CPT

## 2019-11-22 PROCEDURE — 86803 HEPATITIS C AB TEST: CPT

## 2019-11-22 RX ORDER — FOSFOMYCIN TROMETHAMINE 3 G/1
1 POWDER ORAL
Qty: 1 | Refills: 0
Start: 2019-11-22 | End: 2019-11-25

## 2019-11-22 RX ORDER — SACCHAROMYCES BOULARDII 250 MG
1 POWDER IN PACKET (EA) ORAL
Qty: 8 | Refills: 0
Start: 2019-11-22 | End: 2019-11-25

## 2019-11-22 RX ORDER — FOSFOMYCIN TROMETHAMINE 3 G/1
3 POWDER ORAL ONCE
Refills: 0 | Status: COMPLETED | OUTPATIENT
Start: 2019-11-22 | End: 2019-11-22

## 2019-11-22 RX ADMIN — MEROPENEM 100 MILLIGRAM(S): 1 INJECTION INTRAVENOUS at 06:28

## 2019-11-22 RX ADMIN — Medication 250 MILLIGRAM(S): at 06:28

## 2019-11-22 RX ADMIN — FOSFOMYCIN TROMETHAMINE 3 GRAM(S): 3 POWDER ORAL at 17:03

## 2019-11-22 RX ADMIN — AMLODIPINE BESYLATE 10 MILLIGRAM(S): 2.5 TABLET ORAL at 06:28

## 2019-11-22 RX ADMIN — ENOXAPARIN SODIUM 40 MILLIGRAM(S): 100 INJECTION SUBCUTANEOUS at 12:52

## 2019-11-22 NOTE — DISCHARGE NOTE PROVIDER - HOSPITAL COURSE
59 year old male hx HTN, HLD, BPH who initially presented with intermittent urinary retention and urinary incontinence associated with subjective fevers and chills beginning last week.  His symptoms persisted, then he began to have to have urinary frequency and urgency which prompted him to go to an urgent care center where he was diagnosed with UTI and started on ciprofloxacin.  He received a call by the urgent care center earlier today advising him to go to the hospital after his urine culture was positive for ESBL.  Pt has a copy for the urine culture report on his cellphone which shows 60K ESBL. Pt reports that his urinary retention symptoms have been improving.  While in ED, he had a renal US that was negative for hydronephrosis. Patient was seen by ID; started on merrem and then switched to fosphomycin.         Medically stable for discharge home. Time spent on discharge 45 minutes.

## 2019-11-22 NOTE — PROGRESS NOTE ADULT - SUBJECTIVE AND OBJECTIVE BOX
[facility]  INFECTIOUS DISEASES AND INTERNAL MEDICINE at [place]  =======================================================  Rafat Hanson MD  Diplomates American Board of Internal Medicine and Infectious Diseases  =======================================================    [id number]  NASIR HERNANDEZ     Follow up: ESBL UTI     Symptoms are improved, no abdominal or flank or back pain. No fever.  No bacteremia. Renal USG normal. UA and UC already neg in H.   Urine culture showed Ecoli 60,000 CFU, R to all meds except amikacin and carbapenems.    PAST MEDICAL & SURGICAL HISTORY:  Benign prostatic hyperplasia without lower urinary tract symptoms  High cholesterol  Hypertension, unspecified type  No significant past surgical history    Social Hx: No smoking, or drugs, drinks once in a while.      FAMILY HISTORY:  FH: hyperlipidemia    Allergies  No Known Allergies    Antibiotics:  ertapenem  IVPB 1000 milliGRAM(s) IV Intermittent every 24 hours     REVIEW OF SYSTEMS:  CONSTITUTIONAL:  No Fever or chills  HEENT:  No diplopia or blurred vision.  No sore throat or runny nose.  CARDIOVASCULAR:  No chest pain or SOB.  RESPIRATORY:  No cough, shortness of breath, PND or orthopnea.  GASTROINTESTINAL:  No nausea, vomiting or diarrhea.  GENITOURINARY:  + dysuria, frequency and urgency.   MUSCULOSKELETAL:  no joint aches, no muscle pain  SKIN:  No change in skin, hair or nails.  NEUROLOGIC:  No paresthesias, fasciculations, seizures or weakness.  PSYCHIATRIC:  No disorder of thought or mood.  ENDOCRINE:  No heat or cold intolerance, polyuria or polydipsia.  HEMATOLOGICAL:  No easy bruising or bleeding.     Physical Exam:  Vital Signs Last 24 Hrs  T(C): 36.8 (2019 10:22), Max: 37.6 (2019 16:05)  T(F): 98.3 (2019 10:22), Max: 99.6 (2019 16:05)  HR: 77 (2019 10:22) (71 - 84)  BP: 120/76 (2019 10:22) (120/76 - 134/91)  RR: 18 (2019 10:22) (16 - 18)  SpO2: 97% (2019 10:22) (97% - 100%)  GEN: NAD  HEENT: normocephalic and atraumatic. EOMI. PERRL.    NECK: Supple.  No lymphadenopathy   LUNGS: Clear to auscultation.  HEART: Regular rate and rhythm 2/6 systolic murmur.  ABDOMEN: Soft, nontender, and nondistended.  Positive bowel sounds.    : No CVA tenderness  EXTREMITIES: Without any cyanosis, clubbing, rash, lesions or edema.  NEUROLOGIC: grossly intact.  PSYCHIATRIC: Appropriate affect .  SKIN: No ulceration or induration present.    Labs:      139  |  103  |  12.0  ----------------------------<  96  4.0   |  23.0  |  0.86    Ca    9.1      2019 05:55  Mg     2.2         TPro  7.0  /  Alb  3.4  /  TBili  1.0  /  DBili  x   /  AST  38  /  ALT  46<H>  /  AlkPhos  111                          10.8   5.75  )-----------( 286      ( 2019 05:55 )             34.9     Urinalysis Basic - ( 2019 17:24 )    Color: Yellow / Appearance: Clear / S.020 / pH: x  Gluc: x / Ketone: Trace  / Bili: Negative / Urobili: Negative mg/dL   Blood: x / Protein: 30 mg/dL / Nitrite: Negative   Leuk Esterase: Trace / RBC: 0-2 /HPF / WBC 0-2   Sq Epi: x / Non Sq Epi: Occasional / Bacteria: Negative    LIVER FUNCTIONS - ( 2019 18:39 )  Alb: 3.4 g/dL / Pro: 7.0 g/dL / ALK PHOS: 111 U/L / ALT: 46 U/L / AST: 38 U/L / GGT: x           RECENT CULTURES:   @ 17:24 .Urine     No growth    All imaging and other data have been reviewed.    Assessment and Plan:   58y/o man with PMH of HTN and BPH was admitted on  with intermittent urinary retention and urinary incontinence associated with fevers and chills for one week. He also had urinary frequency and urgency, seen in urgent care and had UA and UC and discharged with ciprofloxacin. When UC came back ESBL Ecoli (only S to Carbapenems and amikacin) he was called back to ED for IV antibiotics treatment. In H UA and UC neg and improved quickly with IV ertapenem and later meropenem. No bacteremia.     ESBL ecoli UTI  BPH and urinary retention    - UA and UC negative in [facility]  - ESBL ecoli in UC 60,000 in urgent care but symptomatic  - Can stop meropenem 1gm q8h   - Renal USG with no retention, hydronephrosis or structural abnormalities  -  referral after discharge.    - WBC from 14k-->8k  - Symptoms are resolved  - GIve one dose of Fosfomycin 3gm stat today and discharge with fosfamycin 3gm po every other dys for 2 more doses.   - Repeat UA and UC in 2 weeks (will do in urgent care of ID office)     Will sign off please call with any question.

## 2019-11-22 NOTE — DISCHARGE NOTE PROVIDER - CARE PROVIDER_API CALL
Lorenza Pierce (MD)  Infectious Disease; Internal Medicine  [address] [place] [address], [place] [address]  Phone: [phone]  Fax: [phone]  Follow Up Time:

## 2019-11-22 NOTE — DISCHARGE NOTE PROVIDER - NSDCMRMEDTOKEN_GEN_ALL_CORE_FT
amLODIPine 10 mg oral tablet: 1 tab(s) orally once a day  fosfomycin 3 g oral powder for reconstitution: 1 each orally every 48 hours   pravastatin 40 mg oral tablet: 1 tab(s) orally once a day  saccharomyces boulardii lyo 250 mg oral capsule: 1 cap(s) orally 2 times a day  tamsulosin 0.4 mg oral capsule: 1 cap(s) orally once a day amLODIPine 10 mg oral tablet: 1 tab(s) orally once a day  pravastatin 40 mg oral tablet: 1 tab(s) orally once a day  tamsulosin 0.4 mg oral capsule: 1 cap(s) orally once a day

## 2019-11-22 NOTE — DISCHARGE NOTE NURSING/CASE MANAGEMENT/SOCIAL WORK - PATIENT PORTAL LINK FT
You can access the FollowMyHealth Patient Portal offered by [facility] by registering at the following website: [email or web address]. By joining [facility]’s FollowMyHealth portal, you will also be able to view your health information using other applications (apps) compatible with our system.

## 2019-11-22 NOTE — DISCHARGE NOTE PROVIDER - NSDCCPCAREPLAN_GEN_ALL_CORE_FT
PRINCIPAL DISCHARGE DIAGNOSIS  Diagnosis: ESBL (extended spectrum beta-lactamase) producing bacteria infection  Assessment and Plan of Treatment: please take the next dose of your antibiotic on 11/24 and then the final dose on 11/26  please take this along with a probiotic  please follow up with your PCP and ID within 1-2 weeks      SECONDARY DISCHARGE DIAGNOSES  Diagnosis: Hypertension  Assessment and Plan of Treatment: continue home medications and please follow up with your PCP within 1 week PRINCIPAL DISCHARGE DIAGNOSIS  Diagnosis: ESBL (extended spectrum beta-lactamase) producing bacteria infection  Assessment and Plan of Treatment: you have been treated for this infection  please follow up with your PCP and ID within 1-2 weeks      SECONDARY DISCHARGE DIAGNOSES  Diagnosis: Hypertension  Assessment and Plan of Treatment: continue home medications and please follow up with your PCP within 1 week

## 2020-01-15 PROBLEM — N40.0 BENIGN PROSTATIC HYPERPLASIA WITHOUT LOWER URINARY TRACT SYMPTOMS: Chronic | Status: ACTIVE | Noted: 2019-11-20

## 2020-01-15 PROBLEM — I10 ESSENTIAL (PRIMARY) HYPERTENSION: Chronic | Status: ACTIVE | Noted: 2019-11-20

## 2020-01-15 PROBLEM — E78.00 PURE HYPERCHOLESTEROLEMIA, UNSPECIFIED: Chronic | Status: ACTIVE | Noted: 2019-11-20

## 2020-01-22 ENCOUNTER — APPOINTMENT (OUTPATIENT)
Dept: INTERNAL MEDICINE | Facility: CLINIC | Age: 60
End: 2020-01-22
Payer: SELF-PAY

## 2020-01-22 VITALS
WEIGHT: 193 LBS | SYSTOLIC BLOOD PRESSURE: 120 MMHG | HEIGHT: 68 IN | BODY MASS INDEX: 29.25 KG/M2 | DIASTOLIC BLOOD PRESSURE: 70 MMHG

## 2020-01-22 DIAGNOSIS — A49.9 URINARY TRACT INFECTION, SITE NOT SPECIFIED: ICD-10-CM

## 2020-01-22 DIAGNOSIS — N40.0 BENIGN PROSTATIC HYPERPLASIA WITHOUT LOWER URINARY TRACT SYMPMS: ICD-10-CM

## 2020-01-22 DIAGNOSIS — N39.0 URINARY TRACT INFECTION, SITE NOT SPECIFIED: ICD-10-CM

## 2020-01-22 PROCEDURE — 99213 OFFICE O/P EST LOW 20 MIN: CPT

## 2020-01-22 RX ORDER — TROSPIUM CHLORIDE 20 MG/1
20 TABLET, FILM COATED ORAL
Refills: 0 | Status: ACTIVE | COMMUNITY

## 2020-01-22 NOTE — PHYSICAL EXAM
[General Appearance - In No Acute Distress] : in no acute distress [General Appearance - Alert] : alert [Sclera] : the sclera and conjunctiva were normal [PERRL With Normal Accommodation] : pupils were equal in size, round, reactive to light [Extraocular Movements] : extraocular movements were intact [Oropharynx] : the oropharynx was normal with no thrush [Outer Ear] : the ears and nose were normal in appearance [Neck Appearance] : the appearance of the neck was normal [Neck Cervical Mass (___cm)] : no neck mass was observed [Thyroid Diffuse Enlargement] : the thyroid was not enlarged [Jugular Venous Distention Increased] : there was no jugular-venous distention [Heart Rate And Rhythm] : heart rate was normal and rhythm regular [Auscultation Breath Sounds / Voice Sounds] : lungs were clear to auscultation bilaterally [Heart Sounds] : normal S1 and S2 [Heart Sounds Gallop] : no gallops [Murmurs] : no murmurs [Bowel Sounds] : normal bowel sounds [Heart Sounds Pericardial Friction Rub] : no pericardial rub [Abdomen Tenderness] : non-tender [Abdomen Soft] : soft [No Palpable Adenopathy] : no palpable adenopathy [Costovertebral Angle Tenderness] : no CVA tenderness [Abdomen Mass (___ Cm)] : no abdominal mass palpated [Motor Tone] : muscle strength and tone were normal [Musculoskeletal - Swelling] : no joint swelling [Nail Clubbing] : no clubbing  or cyanosis of the fingernails [] : no rash [Skin Color & Pigmentation] : normal skin color and pigmentation [No Focal Deficits] : no focal deficits [Sensation] : the sensory exam was normal to light touch and pinprick [Deep Tendon Reflexes (DTR)] : deep tendon reflexes were 2+ and symmetric [Affect] : the affect was normal [Oriented To Time, Place, And Person] : oriented to person, place, and time

## 2020-01-22 NOTE — HISTORY OF PRESENT ILLNESS
[FreeTextEntry1] : 60y/o man with PMH of HTN and BPH was admitted on 11/20 at [facility] with intermittent\par urinary retention and urinary incontinence associated with fevers and chills\par for one week. He also had urinary frequency and urgency, seen in urgent care\par and had UA and UC and discharged with ciprofloxacin. When UC came back ESBL\par Ecoli he was called back to ED for IV antibiotics treatment. In [facility] he was started on carbapenem and then fosfamycin given 2 doses. \par Today he is here for follow up. \par NO dysuria, fever, frequency or incontinence. \par Seen urologist in the past. \par No smoking or drugs, drinks occasionally. \par

## 2020-01-22 NOTE — ASSESSMENT
[FreeTextEntry1] : Doing well , no sign of UTI. \par Will do UA and UC. \par Will see urologist soon. \par On tamsulosin, will continue. \par Return PRN [Treatment Education] : treatment education [Anticipatory Guidance] : anticipatory guidance [Risk Reduction] : risk reduction

## 2020-01-31 LAB
APPEARANCE: CLEAR
BACTERIA UR CULT: NORMAL
BILIRUBIN URINE: NEGATIVE
BLOOD URINE: NEGATIVE
COLOR: YELLOW
GLUCOSE QUALITATIVE U: NEGATIVE
KETONES URINE: NEGATIVE
LEUKOCYTE ESTERASE URINE: NEGATIVE
NITRITE URINE: NEGATIVE
PH URINE: 6.5
PROTEIN URINE: NORMAL
SPECIFIC GRAVITY URINE: 1.02
UROBILINOGEN URINE: NORMAL

## 2020-04-21 NOTE — DISCHARGE NOTE PROVIDER - PROVIDER RX CONTACT NUMBER
[phone]

## 2020-12-23 PROBLEM — N39.0 BACTERIAL UTI: Status: RESOLVED | Noted: 2020-01-22 | Resolved: 2020-12-23

## 2021-02-17 NOTE — PROGRESS NOTE ADULT - ASSESSMENT
59 years old male with,    1) UTI with ESBL E. Coli  - Continue Meropenem  - ID Consult appreciated   2) BPH  - Continue Flomax  3) HTN  - Continue Amlodipine  4) HLD  - Continue Lipitor   5) Iron Deficiency Anemia  - Venofer 200 mg x 5 doses  DVT Prophylaxis -- Lovenox 40 mg    Dispo: Home in 24 to 48 hours. 10-Feb-2021 22:00

## 2022-06-13 NOTE — ED ADULT TRIAGE NOTE - CHIEF COMPLAINT QUOTE
Pt states "urgent care sent me here because they think I need fluid, I went there because I wasn't holding my urine", denies pain
[facility]

## 2023-12-21 ENCOUNTER — OFFICE (OUTPATIENT)
Dept: URBAN - METROPOLITAN AREA CLINIC 12 | Facility: CLINIC | Age: 63
Setting detail: OPHTHALMOLOGY
End: 2023-12-21
Payer: COMMERCIAL

## 2023-12-21 DIAGNOSIS — H25.13: ICD-10-CM

## 2023-12-21 PROCEDURE — 99203 OFFICE O/P NEW LOW 30 MIN: CPT | Performed by: STUDENT IN AN ORGANIZED HEALTH CARE EDUCATION/TRAINING PROGRAM

## 2023-12-21 ASSESSMENT — REFRACTION_CURRENTRX
OS_AXIS: 079
OD_CYLINDER: -0.25
OD_ADD: +2.75
OS_CYLINDER: -1.00
OD_SPHERE: +3.50
OS_OVR_VA: 20/
OD_AXIS: 068
OS_AXIS: 083
OS_ADD: +2.00
OS_VPRISM_DIRECTION: PROGS
OD_VPRISM_DIRECTION: PROGS
OD_VPRISM_DIRECTION: PROGS
OS_SPHERE: +3.25
OS_VPRISM_DIRECTION: PROGS
OD_OVR_VA: 20/
OD_SPHERE: +2.75
OD_ADD: +2.00
OD_OVR_VA: 20/
OS_CYLINDER: -0.75
OD_OVR_VA: 20/
OS_CYLINDER: -0.50
OD_VPRISM_DIRECTION: SV
OS_ADD: +2.75
OS_SPHERE: +2.50
OD_SPHERE: +2.50
OD_AXIS: 063
OS_OVR_VA: 20/
OS_AXIS: 111
OD_CYLINDER: -1.25
OS_SPHERE: +2.50
OS_OVR_VA: 20/
OD_CYLINDER: -1.25
OD_AXIS: 059
OS_VPRISM_DIRECTION: SV

## 2023-12-21 ASSESSMENT — REFRACTION_MANIFEST
OD_SPHERE: -0.75
OD_CYLINDER: -1.00
OD_AXIS: 149
OS_SPHERE: +0.50
OS_AXIS: 103
OS_CYLINDER: -1.25

## 2023-12-21 ASSESSMENT — SPHEQUIV_DERIVED
OS_SPHEQUIV: -0.125
OD_SPHEQUIV: -1.25
OS_SPHEQUIV: -0.125
OD_SPHEQUIV: -1.25

## 2023-12-21 ASSESSMENT — REFRACTION_AUTOREFRACTION
OS_AXIS: 103
OD_AXIS: 149
OD_SPHERE: -0.75
OS_CYLINDER: -1.25
OS_SPHERE: +0.50
OD_CYLINDER: -1.00

## 2023-12-21 ASSESSMENT — CONFRONTATIONAL VISUAL FIELD TEST (CVF)
OD_FINDINGS: FULL
OS_FINDINGS: FULL

## 2024-02-10 ENCOUNTER — OFFICE (OUTPATIENT)
Dept: URBAN - METROPOLITAN AREA CLINIC 12 | Facility: CLINIC | Age: 64
Setting detail: OPHTHALMOLOGY
End: 2024-02-10
Payer: COMMERCIAL

## 2024-02-10 DIAGNOSIS — H25.11: ICD-10-CM

## 2024-02-10 DIAGNOSIS — H25.13: ICD-10-CM

## 2024-02-10 PROCEDURE — 99213 OFFICE O/P EST LOW 20 MIN: CPT | Performed by: STUDENT IN AN ORGANIZED HEALTH CARE EDUCATION/TRAINING PROGRAM

## 2024-02-10 PROCEDURE — 92136 OPHTHALMIC BIOMETRY: CPT | Mod: 26,RT | Performed by: STUDENT IN AN ORGANIZED HEALTH CARE EDUCATION/TRAINING PROGRAM

## 2024-02-10 PROCEDURE — 92136 OPHTHALMIC BIOMETRY: CPT | Mod: TC | Performed by: STUDENT IN AN ORGANIZED HEALTH CARE EDUCATION/TRAINING PROGRAM

## 2024-02-10 ASSESSMENT — SPHEQUIV_DERIVED
OD_SPHEQUIV: -1.25
OD_SPHEQUIV: -2.625
OS_SPHEQUIV: -0.125

## 2024-02-10 ASSESSMENT — REFRACTION_MANIFEST
OS_CYLINDER: -1.25
OD_SPHERE: -0.75
OS_AXIS: 103
OD_CYLINDER: -1.00
OD_AXIS: 149
OS_SPHERE: +0.50

## 2024-02-10 ASSESSMENT — REFRACTION_CURRENTRX
OS_CYLINDER: -1.00
OS_OVR_VA: 20/
OD_AXIS: 059
OS_SPHERE: +3.25
OD_ADD: +2.75
OD_OVR_VA: 20/
OS_AXIS: 111
OS_SPHERE: +2.50
OS_ADD: +2.00
OS_OVR_VA: 20/
OD_CYLINDER: -0.25
OD_VPRISM_DIRECTION: PROGS
OS_SPHERE: +2.50
OD_AXIS: 063
OS_VPRISM_DIRECTION: PROGS
OD_ADD: +2.00
OD_OVR_VA: 20/
OS_CYLINDER: -0.75
OS_VPRISM_DIRECTION: SV
OS_CYLINDER: -0.50
OD_SPHERE: +2.75
OD_CYLINDER: -1.25
OD_VPRISM_DIRECTION: PROGS
OS_ADD: +2.75
OS_OVR_VA: 20/
OS_AXIS: 083
OS_VPRISM_DIRECTION: PROGS
OD_VPRISM_DIRECTION: SV
OD_OVR_VA: 20/
OD_SPHERE: +2.50
OD_AXIS: 068
OD_SPHERE: +3.50
OD_CYLINDER: -1.25
OS_AXIS: 079

## 2024-02-10 ASSESSMENT — REFRACTION_AUTOREFRACTION
OS_AXIS: 091
OD_SPHERE: -2.25
OD_AXIS: 161
OD_CYLINDER: -0.75
OS_SPHERE: PLANO
OS_CYLINDER: -1.25

## 2024-02-10 ASSESSMENT — CONFRONTATIONAL VISUAL FIELD TEST (CVF)
OD_FINDINGS: FULL
OS_FINDINGS: FULL

## 2024-02-20 ENCOUNTER — AMBUL SURGICAL CARE (OUTPATIENT)
Dept: URBAN - METROPOLITAN AREA SURGERY 2 | Facility: SURGERY | Age: 64
Setting detail: OPHTHALMOLOGY
End: 2024-02-20
Payer: COMMERCIAL

## 2024-02-20 DIAGNOSIS — Z96.1: ICD-10-CM

## 2024-02-20 PROCEDURE — 99024 POSTOP FOLLOW-UP VISIT: CPT | Performed by: STUDENT IN AN ORGANIZED HEALTH CARE EDUCATION/TRAINING PROGRAM

## 2024-02-20 ASSESSMENT — CONFRONTATIONAL VISUAL FIELD TEST (CVF)
OS_FINDINGS: FULL
OD_FINDINGS: FULL

## 2024-02-20 ASSESSMENT — REFRACTION_CURRENTRX
OS_AXIS: 083
OS_CYLINDER: -0.50
OS_SPHERE: +3.25
OD_AXIS: 063
OS_OVR_VA: 20/
OD_SPHERE: +2.50
OD_CYLINDER: -1.25
OS_OVR_VA: 20/
OS_AXIS: 111
OS_CYLINDER: -0.75
OD_CYLINDER: -1.25
OD_AXIS: 059
OD_VPRISM_DIRECTION: PROGS
OD_OVR_VA: 20/
OS_VPRISM_DIRECTION: PROGS
OD_OVR_VA: 20/
OS_VPRISM_DIRECTION: PROGS
OS_ADD: +2.75
OD_VPRISM_DIRECTION: PROGS
OS_VPRISM_DIRECTION: SV
OS_ADD: +2.00
OS_OVR_VA: 20/
OS_SPHERE: +2.50
OD_CYLINDER: -0.25
OS_SPHERE: +2.50
OS_AXIS: 079
OD_AXIS: 068
OD_OVR_VA: 20/
OD_VPRISM_DIRECTION: SV
OD_SPHERE: +3.50
OD_SPHERE: +2.75
OD_ADD: +2.75
OS_CYLINDER: -1.00
OD_ADD: +2.00

## 2024-02-20 ASSESSMENT — REFRACTION_MANIFEST
OS_CYLINDER: -1.25
OD_SPHERE: -0.75
OD_CYLINDER: -1.00
OD_AXIS: 149
OS_SPHERE: +0.50
OS_AXIS: 103

## 2024-02-20 ASSESSMENT — SPHEQUIV_DERIVED
OD_SPHEQUIV: -2.625
OS_SPHEQUIV: -0.125
OD_SPHEQUIV: -1.25

## 2024-02-20 ASSESSMENT — REFRACTION_AUTOREFRACTION
OS_SPHERE: PLANO
OS_CYLINDER: -1.25
OD_AXIS: 161
OS_AXIS: 091
OD_CYLINDER: -0.75
OD_SPHERE: -2.25

## 2024-02-21 ENCOUNTER — ASC (OUTPATIENT)
Dept: URBAN - METROPOLITAN AREA SURGERY 8 | Facility: SURGERY | Age: 64
Setting detail: OPHTHALMOLOGY
End: 2024-02-21
Payer: COMMERCIAL

## 2024-02-21 ENCOUNTER — AMBUL SURGICAL CARE (OUTPATIENT)
Dept: URBAN - METROPOLITAN AREA SURGERY 2 | Facility: SURGERY | Age: 64
Setting detail: OPHTHALMOLOGY
End: 2024-02-21
Payer: COMMERCIAL

## 2024-02-21 DIAGNOSIS — H59.021: ICD-10-CM

## 2024-02-21 PROCEDURE — 66985 INSERT LENS PROSTHESIS: CPT | Mod: LT | Performed by: OPHTHALMOLOGY

## 2024-02-21 PROCEDURE — 67036 REMOVAL OF INNER EYE FLUID: CPT | Mod: LT | Performed by: OPHTHALMOLOGY

## 2024-02-21 PROCEDURE — 92012 INTRM OPH EXAM EST PATIENT: CPT | Performed by: OPHTHALMOLOGY

## 2024-02-21 PROCEDURE — 66682 REPAIR IRIS & CILIARY BODY: CPT | Mod: LT | Performed by: OPHTHALMOLOGY

## 2024-02-21 ASSESSMENT — CONFRONTATIONAL VISUAL FIELD TEST (CVF)
OS_FINDINGS: FULL
OD_FINDINGS: FULL

## 2024-02-21 ASSESSMENT — SPHEQUIV_DERIVED: OD_SPHEQUIV: -2.625

## 2024-02-21 ASSESSMENT — REFRACTION_AUTOREFRACTION
OD_SPHERE: -2.25
OD_CYLINDER: -0.75
OS_SPHERE: PLANO
OS_CYLINDER: -1.25
OS_AXIS: 091
OD_AXIS: 161

## 2024-02-22 ENCOUNTER — OFFICE (OUTPATIENT)
Dept: URBAN - METROPOLITAN AREA CLINIC 115 | Facility: CLINIC | Age: 64
Setting detail: OPHTHALMOLOGY
End: 2024-02-22
Payer: COMMERCIAL

## 2024-02-22 DIAGNOSIS — H59.021: ICD-10-CM

## 2024-02-22 PROCEDURE — 99024 POSTOP FOLLOW-UP VISIT: CPT | Performed by: OPHTHALMOLOGY

## 2024-02-22 ASSESSMENT — SPHEQUIV_DERIVED: OD_SPHEQUIV: -2.625

## 2024-02-22 ASSESSMENT — REFRACTION_AUTOREFRACTION
OS_CYLINDER: -1.25
OD_SPHERE: -2.25
OD_AXIS: 161
OD_CYLINDER: -0.75
OS_SPHERE: PLANO
OS_AXIS: 091

## 2024-02-22 ASSESSMENT — CONFRONTATIONAL VISUAL FIELD TEST (CVF)
OS_FINDINGS: FULL
OD_FINDINGS: FULL

## 2024-02-25 ENCOUNTER — OFFICE (OUTPATIENT)
Dept: URBAN - METROPOLITAN AREA CLINIC 12 | Facility: CLINIC | Age: 64
Setting detail: OPHTHALMOLOGY
End: 2024-02-25
Payer: COMMERCIAL

## 2024-02-25 ENCOUNTER — RX ONLY (RX ONLY)
Age: 64
End: 2024-02-25

## 2024-02-25 DIAGNOSIS — H25.12: ICD-10-CM

## 2024-02-25 PROCEDURE — 92136 OPHTHALMIC BIOMETRY: CPT | Mod: LT | Performed by: STUDENT IN AN ORGANIZED HEALTH CARE EDUCATION/TRAINING PROGRAM

## 2024-02-25 ASSESSMENT — CONFRONTATIONAL VISUAL FIELD TEST (CVF)
OS_FINDINGS: FULL
OD_FINDINGS: FULL

## 2024-02-25 ASSESSMENT — CORNEAL EDEMA CLINICAL DESCRIPTION: OD_CORNEALEDEMA: 1+

## 2024-02-26 ASSESSMENT — REFRACTION_CURRENTRX
OS_OVR_VA: 20/
OS_SPHERE: +2.25
OD_CYLINDER: -1.25
OS_VPRISM_DIRECTION: PROGS
OD_SPHERE: +2.25
OD_AXIS: 071
OS_ADD: +3.00
OD_VPRISM_DIRECTION: PROGS
OD_OVR_VA: 20/
OS_CYLINDER: -1.00
OS_AXIS: 080
OD_ADD: +2.50

## 2024-02-26 ASSESSMENT — REFRACTION_AUTOREFRACTION
OD_AXIS: 135
OS_SPHERE: ERROR
OD_SPHERE: -0.25
OD_CYLINDER: -1.75

## 2024-02-26 ASSESSMENT — SPHEQUIV_DERIVED: OD_SPHEQUIV: -1.125

## 2024-02-29 ENCOUNTER — OFFICE (OUTPATIENT)
Dept: URBAN - METROPOLITAN AREA CLINIC 115 | Facility: CLINIC | Age: 64
Setting detail: OPHTHALMOLOGY
End: 2024-02-29
Payer: COMMERCIAL

## 2024-02-29 DIAGNOSIS — H59.021: ICD-10-CM

## 2024-02-29 PROCEDURE — 99024 POSTOP FOLLOW-UP VISIT: CPT | Performed by: OPHTHALMOLOGY

## 2024-02-29 ASSESSMENT — REFRACTION_CURRENTRX
OD_ADD: +2.50
OS_ADD: +3.00
OD_AXIS: 071
OS_AXIS: 080
OD_VPRISM_DIRECTION: PROGS
OS_CYLINDER: -1.00
OD_SPHERE: +2.25
OD_CYLINDER: -1.25
OS_VPRISM_DIRECTION: PROGS
OS_SPHERE: +2.25
OS_OVR_VA: 20/
OD_OVR_VA: 20/

## 2024-02-29 ASSESSMENT — CONFRONTATIONAL VISUAL FIELD TEST (CVF)
OD_FINDINGS: FULL
OS_FINDINGS: FULL

## 2024-03-01 ENCOUNTER — OFFICE (OUTPATIENT)
Dept: URBAN - METROPOLITAN AREA CLINIC 115 | Facility: CLINIC | Age: 64
Setting detail: OPHTHALMOLOGY
End: 2024-03-01
Payer: COMMERCIAL

## 2024-03-01 DIAGNOSIS — S05.02XA: ICD-10-CM

## 2024-03-01 PROBLEM — Z96.1 PSEUDOPHAKIA ; RIGHT EYE: Status: ACTIVE | Noted: 2024-02-25

## 2024-03-01 PROBLEM — H52.7 REFRACTIVE ERROR ; BOTH EYES: Status: ACTIVE | Noted: 2024-02-25

## 2024-03-01 PROBLEM — H25.12 CATARACT SENILE NUCLEAR SCLEROSIS;  , LEFT EYE: Status: ACTIVE | Noted: 2024-02-25

## 2024-03-01 PROBLEM — H59.021 LENS FRAGMENTS IN EYE FOLLOWING CATARACT SURGERY; RIGHT EYE: Status: ACTIVE | Noted: 2024-02-21

## 2024-03-01 PROCEDURE — 99212 OFFICE O/P EST SF 10 MIN: CPT | Mod: 24 | Performed by: OPTOMETRIST

## 2024-03-01 ASSESSMENT — REFRACTION_CURRENTRX
OD_SPHERE: +2.25
OS_SPHERE: +2.25
OD_ADD: +2.50
OS_ADD: +3.00
OD_OVR_VA: 20/
OS_VPRISM_DIRECTION: PROGS
OS_OVR_VA: 20/
OS_AXIS: 080
OD_AXIS: 071
OD_VPRISM_DIRECTION: PROGS
OS_CYLINDER: -1.00
OD_CYLINDER: -1.25

## 2024-03-04 ENCOUNTER — RX ONLY (RX ONLY)
Age: 64
End: 2024-03-04

## 2024-03-04 ENCOUNTER — OFFICE (OUTPATIENT)
Dept: URBAN - METROPOLITAN AREA CLINIC 12 | Facility: CLINIC | Age: 64
Setting detail: OPHTHALMOLOGY
End: 2024-03-04
Payer: COMMERCIAL

## 2024-03-04 DIAGNOSIS — H59.021: ICD-10-CM

## 2024-03-04 DIAGNOSIS — S05.02XS: ICD-10-CM

## 2024-03-04 DIAGNOSIS — H25.12: ICD-10-CM

## 2024-03-04 DIAGNOSIS — Z96.1: ICD-10-CM

## 2024-03-04 PROCEDURE — 99024 POSTOP FOLLOW-UP VISIT: CPT | Performed by: OPHTHALMOLOGY

## 2024-03-04 ASSESSMENT — REFRACTION_CURRENTRX
OD_CYLINDER: -1.25
OD_AXIS: 071
OS_ADD: +3.00
OD_ADD: +2.50
OS_AXIS: 080
OS_CYLINDER: -1.00
OS_VPRISM_DIRECTION: PROGS
OS_SPHERE: +2.25
OD_SPHERE: +2.25
OD_VPRISM_DIRECTION: PROGS
OS_OVR_VA: 20/
OD_OVR_VA: 20/

## 2024-03-06 ENCOUNTER — RX ONLY (RX ONLY)
Age: 64
End: 2024-03-06

## 2024-03-06 ENCOUNTER — OFFICE (OUTPATIENT)
Dept: URBAN - METROPOLITAN AREA CLINIC 12 | Facility: CLINIC | Age: 64
Setting detail: OPHTHALMOLOGY
End: 2024-03-06
Payer: COMMERCIAL

## 2024-03-06 DIAGNOSIS — Z96.1: ICD-10-CM

## 2024-03-06 DIAGNOSIS — H59.021: ICD-10-CM

## 2024-03-06 DIAGNOSIS — H25.12: ICD-10-CM

## 2024-03-06 PROCEDURE — 99024 POSTOP FOLLOW-UP VISIT: CPT | Performed by: OPHTHALMOLOGY

## 2024-03-06 ASSESSMENT — REFRACTION_CURRENTRX
OD_AXIS: 071
OS_OVR_VA: 20/
OS_VPRISM_DIRECTION: PROGS
OD_OVR_VA: 20/
OS_SPHERE: +2.25
OS_CYLINDER: -1.00
OD_SPHERE: +2.25
OS_ADD: +3.00
OD_CYLINDER: -1.25
OS_AXIS: 080
OD_ADD: +2.50
OD_VPRISM_DIRECTION: PROGS

## 2024-03-14 ENCOUNTER — OFFICE (OUTPATIENT)
Dept: URBAN - METROPOLITAN AREA CLINIC 12 | Facility: CLINIC | Age: 64
Setting detail: OPHTHALMOLOGY
End: 2024-03-14
Payer: COMMERCIAL

## 2024-03-14 DIAGNOSIS — Z96.1: ICD-10-CM

## 2024-03-14 DIAGNOSIS — H59.021: ICD-10-CM

## 2024-03-14 DIAGNOSIS — H25.12: ICD-10-CM

## 2024-03-14 PROCEDURE — 99024 POSTOP FOLLOW-UP VISIT: CPT | Performed by: STUDENT IN AN ORGANIZED HEALTH CARE EDUCATION/TRAINING PROGRAM

## 2024-03-16 ASSESSMENT — REFRACTION_CURRENTRX
OD_ADD: +2.50
OS_CYLINDER: -1.00
OD_VPRISM_DIRECTION: PROGS
OD_AXIS: 071
OD_CYLINDER: -1.25
OS_VPRISM_DIRECTION: PROGS
OD_SPHERE: +2.25
OS_ADD: +3.00
OS_SPHERE: +2.25
OD_OVR_VA: 20/
OS_AXIS: 080
OS_OVR_VA: 20/

## 2024-03-21 ENCOUNTER — OFFICE (OUTPATIENT)
Dept: URBAN - METROPOLITAN AREA CLINIC 115 | Facility: CLINIC | Age: 64
Setting detail: OPHTHALMOLOGY
End: 2024-03-21
Payer: COMMERCIAL

## 2024-03-21 DIAGNOSIS — H59.021: ICD-10-CM

## 2024-03-21 PROCEDURE — 99024 POSTOP FOLLOW-UP VISIT: CPT | Performed by: OPHTHALMOLOGY

## 2024-03-28 ENCOUNTER — OFFICE (OUTPATIENT)
Dept: URBAN - METROPOLITAN AREA CLINIC 12 | Facility: CLINIC | Age: 64
Setting detail: OPHTHALMOLOGY
End: 2024-03-28
Payer: COMMERCIAL

## 2024-03-28 ENCOUNTER — RX ONLY (RX ONLY)
Age: 64
End: 2024-03-28

## 2024-03-28 DIAGNOSIS — H59.021: ICD-10-CM

## 2024-03-28 PROCEDURE — 99024 POSTOP FOLLOW-UP VISIT: CPT | Performed by: STUDENT IN AN ORGANIZED HEALTH CARE EDUCATION/TRAINING PROGRAM

## 2024-03-28 ASSESSMENT — REFRACTION_MANIFEST
OD_VA1: 20/20
OD_SPHERE: PLANO
OD_CYLINDER: -1.75
OD_AXIS: 174

## 2024-04-22 ENCOUNTER — OFFICE (OUTPATIENT)
Dept: URBAN - METROPOLITAN AREA CLINIC 12 | Facility: CLINIC | Age: 64
Setting detail: OPHTHALMOLOGY
End: 2024-04-22
Payer: COMMERCIAL

## 2024-04-22 DIAGNOSIS — H25.12: ICD-10-CM

## 2024-04-22 DIAGNOSIS — H59.021: ICD-10-CM

## 2024-04-22 PROCEDURE — 99024 POSTOP FOLLOW-UP VISIT: CPT | Performed by: STUDENT IN AN ORGANIZED HEALTH CARE EDUCATION/TRAINING PROGRAM

## 2024-04-29 ENCOUNTER — OFFICE (OUTPATIENT)
Dept: URBAN - METROPOLITAN AREA CLINIC 12 | Facility: CLINIC | Age: 64
Setting detail: OPHTHALMOLOGY
End: 2024-04-29
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
## 2024-04-29 PROCEDURE — NO SHOW FE NO SHOW FEE: Performed by: STUDENT IN AN ORGANIZED HEALTH CARE EDUCATION/TRAINING PROGRAM

## 2024-05-02 ENCOUNTER — OFFICE (OUTPATIENT)
Dept: URBAN - METROPOLITAN AREA CLINIC 115 | Facility: CLINIC | Age: 64
Setting detail: OPHTHALMOLOGY
End: 2024-05-02
Payer: COMMERCIAL

## 2024-05-02 DIAGNOSIS — H59.021: ICD-10-CM

## 2024-05-02 PROCEDURE — 92012 INTRM OPH EXAM EST PATIENT: CPT | Performed by: OPHTHALMOLOGY

## 2024-05-02 PROCEDURE — 92134 CPTRZ OPH DX IMG PST SGM RTA: CPT | Performed by: OPHTHALMOLOGY

## 2024-05-02 ASSESSMENT — CONFRONTATIONAL VISUAL FIELD TEST (CVF)
OS_FINDINGS: FULL
OD_FINDINGS: FULL

## 2024-05-17 ENCOUNTER — RX ONLY (RX ONLY)
Age: 64
End: 2024-05-17

## 2024-05-17 ENCOUNTER — OFFICE (OUTPATIENT)
Dept: URBAN - METROPOLITAN AREA CLINIC 12 | Facility: CLINIC | Age: 64
Setting detail: OPHTHALMOLOGY
End: 2024-05-17
Payer: COMMERCIAL

## 2024-05-17 DIAGNOSIS — Z96.1: ICD-10-CM

## 2024-05-17 PROBLEM — H53.2 DIPLOPIA ; RIGHT EYE: Status: ACTIVE | Noted: 2024-05-17

## 2024-05-17 PROCEDURE — 99024 POSTOP FOLLOW-UP VISIT: CPT | Performed by: STUDENT IN AN ORGANIZED HEALTH CARE EDUCATION/TRAINING PROGRAM

## 2024-05-17 ASSESSMENT — CONFRONTATIONAL VISUAL FIELD TEST (CVF)
OS_FINDINGS: FULL
OD_FINDINGS: FULL

## 2024-06-14 ENCOUNTER — OFFICE (OUTPATIENT)
Dept: URBAN - METROPOLITAN AREA CLINIC 88 | Facility: CLINIC | Age: 64
Setting detail: OPHTHALMOLOGY
End: 2024-06-14
Payer: COMMERCIAL

## 2024-06-14 DIAGNOSIS — H53.2: ICD-10-CM

## 2024-06-14 DIAGNOSIS — H25.12: ICD-10-CM

## 2024-06-14 DIAGNOSIS — H59.021: ICD-10-CM

## 2024-06-14 DIAGNOSIS — Z96.1: ICD-10-CM

## 2024-06-14 PROCEDURE — 99214 OFFICE O/P EST MOD 30 MIN: CPT | Performed by: OPHTHALMOLOGY

## 2024-06-14 PROCEDURE — 92134 CPTRZ OPH DX IMG PST SGM RTA: CPT | Performed by: OPHTHALMOLOGY

## 2024-06-14 PROCEDURE — DEFER/DELA DEFER/DELAY 30 DAY: Performed by: OPHTHALMOLOGY

## 2024-06-14 ASSESSMENT — CONFRONTATIONAL VISUAL FIELD TEST (CVF)
OS_FINDINGS: FULL
OD_FINDINGS: FULL

## 2024-06-28 ENCOUNTER — OFFICE (OUTPATIENT)
Dept: URBAN - METROPOLITAN AREA CLINIC 88 | Facility: CLINIC | Age: 64
Setting detail: OPHTHALMOLOGY
End: 2024-06-28
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
## 2024-06-28 PROCEDURE — NO SHOW FE NO SHOW FEE: Performed by: OPHTHALMOLOGY

## 2024-07-11 ENCOUNTER — OFFICE (OUTPATIENT)
Dept: URBAN - METROPOLITAN AREA CLINIC 12 | Facility: CLINIC | Age: 64
Setting detail: OPHTHALMOLOGY
End: 2024-07-11
Payer: COMMERCIAL

## 2024-07-11 DIAGNOSIS — H25.12: ICD-10-CM

## 2024-07-11 PROCEDURE — 92136 OPHTHALMIC BIOMETRY: CPT | Mod: LT | Performed by: STUDENT IN AN ORGANIZED HEALTH CARE EDUCATION/TRAINING PROGRAM

## 2024-07-11 PROCEDURE — 99213 OFFICE O/P EST LOW 20 MIN: CPT | Performed by: STUDENT IN AN ORGANIZED HEALTH CARE EDUCATION/TRAINING PROGRAM

## 2024-07-11 ASSESSMENT — CONFRONTATIONAL VISUAL FIELD TEST (CVF)
OS_FINDINGS: FULL
OD_FINDINGS: FULL

## 2024-07-25 ENCOUNTER — ASC (OUTPATIENT)
Dept: URBAN - METROPOLITAN AREA SURGERY 8 | Facility: SURGERY | Age: 64
Setting detail: OPHTHALMOLOGY
End: 2024-07-25
Payer: COMMERCIAL

## 2024-07-25 DIAGNOSIS — H25.89: ICD-10-CM

## 2024-07-25 DIAGNOSIS — H25.12: ICD-10-CM

## 2024-07-25 PROCEDURE — 66982 XCAPSL CTRC RMVL CPLX WO ECP: CPT | Mod: LT | Performed by: STUDENT IN AN ORGANIZED HEALTH CARE EDUCATION/TRAINING PROGRAM

## 2024-07-26 ENCOUNTER — RX ONLY (RX ONLY)
Age: 64
End: 2024-07-26

## 2024-07-26 ENCOUNTER — OFFICE (OUTPATIENT)
Dept: URBAN - METROPOLITAN AREA CLINIC 12 | Facility: CLINIC | Age: 64
Setting detail: OPHTHALMOLOGY
End: 2024-07-26
Payer: COMMERCIAL

## 2024-07-26 DIAGNOSIS — H59.021: ICD-10-CM

## 2024-07-26 DIAGNOSIS — Z96.1: ICD-10-CM

## 2024-07-26 PROCEDURE — 99024 POSTOP FOLLOW-UP VISIT: CPT | Performed by: OPHTHALMOLOGY

## 2024-07-26 ASSESSMENT — CONFRONTATIONAL VISUAL FIELD TEST (CVF)
OS_FINDINGS: FULL
OD_FINDINGS: FULL

## 2024-08-01 ENCOUNTER — OFFICE (OUTPATIENT)
Dept: URBAN - METROPOLITAN AREA CLINIC 12 | Facility: CLINIC | Age: 64
Setting detail: OPHTHALMOLOGY
End: 2024-08-01
Payer: COMMERCIAL

## 2024-08-01 ENCOUNTER — RX ONLY (RX ONLY)
Age: 64
End: 2024-08-01

## 2024-08-01 DIAGNOSIS — Z96.1: ICD-10-CM

## 2024-08-01 DIAGNOSIS — H40.053: ICD-10-CM

## 2024-08-01 DIAGNOSIS — H20.011: ICD-10-CM

## 2024-08-01 PROCEDURE — 99024 POSTOP FOLLOW-UP VISIT: CPT | Performed by: STUDENT IN AN ORGANIZED HEALTH CARE EDUCATION/TRAINING PROGRAM

## 2024-08-01 ASSESSMENT — CONFRONTATIONAL VISUAL FIELD TEST (CVF)
OS_FINDINGS: FULL
OD_FINDINGS: FULL

## 2024-08-08 ENCOUNTER — OFFICE (OUTPATIENT)
Dept: URBAN - METROPOLITAN AREA CLINIC 12 | Facility: CLINIC | Age: 64
Setting detail: OPHTHALMOLOGY
End: 2024-08-08
Payer: COMMERCIAL

## 2024-08-08 DIAGNOSIS — H20.011: ICD-10-CM

## 2024-08-08 DIAGNOSIS — Z96.1: ICD-10-CM

## 2024-08-08 DIAGNOSIS — H40.053: ICD-10-CM

## 2024-08-08 PROCEDURE — 99024 POSTOP FOLLOW-UP VISIT: CPT | Performed by: STUDENT IN AN ORGANIZED HEALTH CARE EDUCATION/TRAINING PROGRAM

## 2024-08-08 ASSESSMENT — CONFRONTATIONAL VISUAL FIELD TEST (CVF)
OD_FINDINGS: FULL
OS_FINDINGS: FULL

## 2024-08-19 ENCOUNTER — APPOINTMENT (OUTPATIENT)
Dept: ORTHOPEDIC SURGERY | Facility: CLINIC | Age: 64
End: 2024-08-19
Payer: COMMERCIAL

## 2024-08-19 VITALS
HEART RATE: 75 BPM | HEIGHT: 68 IN | BODY MASS INDEX: 32.43 KG/M2 | SYSTOLIC BLOOD PRESSURE: 142 MMHG | DIASTOLIC BLOOD PRESSURE: 90 MMHG | WEIGHT: 214 LBS

## 2024-08-19 DIAGNOSIS — S46.111D STRAIN OF MUSCLE, FASCIA AND TENDON OF LONG HEAD OF BICEPS, RIGHT ARM, SUBSEQUENT ENCOUNTER: ICD-10-CM

## 2024-08-19 PROCEDURE — 99203 OFFICE O/P NEW LOW 30 MIN: CPT | Mod: 25

## 2024-08-19 PROCEDURE — 73080 X-RAY EXAM OF ELBOW: CPT | Mod: RT

## 2024-08-19 NOTE — PHYSICAL EXAM
[de-identified] : General: Awake, alert, no acute distress, Patient was cooperative and appropriate during the examination.  The patient is of normal weight for height and age.   Right shoulder Exam: Physical exam of the shoulder demonstrates normal skin without signs of skin changes or abnormalities. No erythema, warmth, or joint effusion appreciated.  Patient has obvious Jay deformity consistent with long head of the biceps tendon rupture.  Sensation intact light touch C5-T1 Palpable radial pulse Radial/ulnar/median/axillary/musculocutaneous/AIN/PIN nerves grossly intact  Range of motion: Forward Flexion: 180 Abduction: 150 External Rotation: 60 Internal Rotation: T7  Palpation: Not tender to palpation over the glenohumeral joint Not tender palpation over the rotator cuff insertion on the greater tuberosity Not tender to palpation over the AC joint Not tender to palpation of the biceps tendon/bicipital groove  Strength testing: Supraspinatus: 5/5 Infraspinatus: 5/5 Teres minor: 5/5 Subscapularis: 5/5 Biceps/triceps: 5/5  Special test: Empty can test negative  Brooks impingement test negative Speeds test negative Stockton's test negative Lift-off test negative Bell-press test negative Cross-arm adduction test negative Hook test negative for distal biceps rupture at the elbow [de-identified] : X-rays including 3 views of the right elbow were obtained in the office on 8/19/2024 and reviewed with the patient.  There is no acute fracture or dislocation.  There is no arthritis.

## 2024-08-19 NOTE — DISCUSSION/SUMMARY
[de-identified] : Assessment: 63-year-old male with a chronic right proximal biceps rupture  Plan: I had a long discussion with the patient today regarding the nature of their diagnosis and treatment plan. We discussed the risks and benefits of no treatment as well as nonoperative and operative treatments.  I reviewed the patient's x-rays of his elbow in the office which are negative for any acute pathology.  On examination of the arm he has a Jay deformity consistent with a proximal biceps rupture.  He otherwise has good range of motion and strength in both the elbow and the shoulder.  At this time I recommend conservative treatment of the patient's condition with modalities including rest, ice, heat, anti-inflammatory medications, activity modifications, and home stretching and strengthening exercises. I discussed with the patient the risks and benefits associated with NSAID use. GI precautions were discussed.  He may resume his normal activity as tolerated without restriction.  He deferred any formal physical therapy at this time.  He may follow-up as needed  The patient verbalizes their understanding and agrees with the plan.  All questions were answered to their satisfaction.

## 2024-08-19 NOTE — HISTORY OF PRESENT ILLNESS
[de-identified] : 8/19/2024: Ambrocio is a pleasant 63-year-old right-hand-dominant male presents to the office today for evaluation of a right arm injury that was sustained in February 2024.  The patient states that he was lifting something heavy when he felt a pop in his right shoulder.  He had subsequent pain and swelling and he noticed that his arm looked different.  He did not seek any medical care at that time.  Overall his pain has gotten much better but he does get occasional cramping in the arm.  He denies any sensations of weakness in the arm.  The patient denies any fevers, chills, sweats, recent illnesses, numbness, tingling, weakness, or pain elsewhere at this time.
No

## 2024-08-26 ENCOUNTER — OFFICE (OUTPATIENT)
Dept: URBAN - METROPOLITAN AREA CLINIC 12 | Facility: CLINIC | Age: 64
Setting detail: OPHTHALMOLOGY
End: 2024-08-26
Payer: COMMERCIAL

## 2024-08-26 DIAGNOSIS — H40.053: ICD-10-CM

## 2024-08-26 DIAGNOSIS — H20.011: ICD-10-CM

## 2024-08-26 DIAGNOSIS — Z96.1: ICD-10-CM

## 2024-08-26 PROCEDURE — 99024 POSTOP FOLLOW-UP VISIT: CPT | Performed by: STUDENT IN AN ORGANIZED HEALTH CARE EDUCATION/TRAINING PROGRAM

## 2024-08-26 ASSESSMENT — CONFRONTATIONAL VISUAL FIELD TEST (CVF)
OD_FINDINGS: FULL
OS_FINDINGS: FULL

## 2024-09-09 ENCOUNTER — RX ONLY (RX ONLY)
Age: 64
End: 2024-09-09

## 2024-09-09 ENCOUNTER — OFFICE (OUTPATIENT)
Dept: URBAN - METROPOLITAN AREA CLINIC 12 | Facility: CLINIC | Age: 64
Setting detail: OPHTHALMOLOGY
End: 2024-09-09
Payer: COMMERCIAL

## 2024-09-09 DIAGNOSIS — Z96.1: ICD-10-CM

## 2024-09-09 DIAGNOSIS — H40.053: ICD-10-CM

## 2024-09-09 DIAGNOSIS — H20.011: ICD-10-CM

## 2024-09-09 PROCEDURE — 99024 POSTOP FOLLOW-UP VISIT: CPT | Performed by: STUDENT IN AN ORGANIZED HEALTH CARE EDUCATION/TRAINING PROGRAM

## 2024-09-09 ASSESSMENT — CONFRONTATIONAL VISUAL FIELD TEST (CVF)
OD_FINDINGS: FULL
OS_FINDINGS: FULL

## 2024-12-09 ENCOUNTER — OFFICE (OUTPATIENT)
Dept: URBAN - METROPOLITAN AREA CLINIC 12 | Facility: CLINIC | Age: 64
Setting detail: OPHTHALMOLOGY
End: 2024-12-09
Payer: COMMERCIAL

## 2024-12-09 DIAGNOSIS — H20.011: ICD-10-CM

## 2024-12-09 DIAGNOSIS — H59.021: ICD-10-CM

## 2024-12-09 DIAGNOSIS — Z96.1: ICD-10-CM

## 2024-12-09 DIAGNOSIS — H40.053: ICD-10-CM

## 2024-12-09 PROCEDURE — 92014 COMPRE OPH EXAM EST PT 1/>: CPT | Performed by: STUDENT IN AN ORGANIZED HEALTH CARE EDUCATION/TRAINING PROGRAM

## 2024-12-09 PROCEDURE — 92133 CPTRZD OPH DX IMG PST SGM ON: CPT | Performed by: STUDENT IN AN ORGANIZED HEALTH CARE EDUCATION/TRAINING PROGRAM

## 2024-12-09 PROCEDURE — 92020 GONIOSCOPY: CPT | Performed by: STUDENT IN AN ORGANIZED HEALTH CARE EDUCATION/TRAINING PROGRAM

## 2024-12-09 ASSESSMENT — CONFRONTATIONAL VISUAL FIELD TEST (CVF)
OD_FINDINGS: FULL
OS_FINDINGS: FULL

## 2024-12-09 ASSESSMENT — TONOMETRY: OS_IOP_MMHG: 14

## 2024-12-12 ASSESSMENT — KERATOMETRY
OD_K1POWER_DIOPTERS: 43.50
OS_AXISANGLE_DEGREES: 098
OD_AXISANGLE_DEGREES: 029
OD_K2POWER_DIOPTERS: 44.00
OS_K1POWER_DIOPTERS: 44.00
OS_K2POWER_DIOPTERS: 44.50

## 2024-12-12 ASSESSMENT — VISUAL ACUITY
OD_BCVA: 20/20-2
OS_BCVA: 20/20-2

## 2024-12-12 ASSESSMENT — REFRACTION_AUTOREFRACTION
OD_AXIS: 121
OS_AXIS: 064
OD_SPHERE: +1.00
OS_SPHERE: PLANO
OD_CYLINDER: -1.50
OS_CYLINDER: -0.25

## 2024-12-14 ENCOUNTER — OFFICE (OUTPATIENT)
Dept: URBAN - METROPOLITAN AREA CLINIC 88 | Facility: CLINIC | Age: 64
Setting detail: OPHTHALMOLOGY
End: 2024-12-14
Payer: COMMERCIAL

## 2024-12-14 DIAGNOSIS — H59.021: ICD-10-CM

## 2024-12-14 PROCEDURE — 92012 INTRM OPH EXAM EST PATIENT: CPT | Performed by: OPHTHALMOLOGY

## 2024-12-14 ASSESSMENT — KERATOMETRY
OS_K2POWER_DIOPTERS: 44.50
OS_AXISANGLE_DEGREES: 098
OS_K1POWER_DIOPTERS: 44.00
OD_K1POWER_DIOPTERS: 43.50
OD_K2POWER_DIOPTERS: 44.00
OD_AXISANGLE_DEGREES: 029

## 2024-12-14 ASSESSMENT — CONFRONTATIONAL VISUAL FIELD TEST (CVF)
OD_FINDINGS: FULL
OS_FINDINGS: FULL

## 2024-12-14 ASSESSMENT — REFRACTION_AUTOREFRACTION
OS_AXIS: 064
OD_CYLINDER: -1.50
OD_SPHERE: +1.00
OS_SPHERE: PLANO
OD_AXIS: 121
OS_CYLINDER: -0.25

## 2024-12-14 ASSESSMENT — TONOMETRY: OS_IOP_MMHG: 10

## 2024-12-14 ASSESSMENT — VISUAL ACUITY
OS_BCVA: 20/25+
OD_BCVA: 20/20-2

## 2025-01-02 ENCOUNTER — OFFICE (OUTPATIENT)
Dept: URBAN - METROPOLITAN AREA CLINIC 12 | Facility: CLINIC | Age: 65
Setting detail: OPHTHALMOLOGY
End: 2025-01-02
Payer: COMMERCIAL

## 2025-01-02 ENCOUNTER — RX ONLY (RX ONLY)
Age: 65
End: 2025-01-02

## 2025-01-02 DIAGNOSIS — H20.011: ICD-10-CM

## 2025-01-02 DIAGNOSIS — H40.053: ICD-10-CM

## 2025-01-02 PROCEDURE — 92083 EXTENDED VISUAL FIELD XM: CPT | Performed by: STUDENT IN AN ORGANIZED HEALTH CARE EDUCATION/TRAINING PROGRAM

## 2025-01-02 PROCEDURE — 99213 OFFICE O/P EST LOW 20 MIN: CPT | Performed by: STUDENT IN AN ORGANIZED HEALTH CARE EDUCATION/TRAINING PROGRAM

## 2025-01-02 ASSESSMENT — REFRACTION_AUTOREFRACTION
OD_CYLINDER: -0.75
OD_SPHERE: +0.25
OS_SPHERE: +0.25
OD_AXIS: 117
OS_CYLINDER: -0.25

## 2025-01-02 ASSESSMENT — KERATOMETRY
OD_K2POWER_DIOPTERS: 44.25
OS_AXISANGLE_DEGREES: 081
OD_K1POWER_DIOPTERS: 43.75
OS_K2POWER_DIOPTERS: 44.25
OS_K1POWER_DIOPTERS: 44.00
OD_AXISANGLE_DEGREES: 063

## 2025-01-02 ASSESSMENT — CONFRONTATIONAL VISUAL FIELD TEST (CVF)
OS_FINDINGS: FULL
OD_FINDINGS: FULL

## 2025-01-02 ASSESSMENT — VISUAL ACUITY
OD_BCVA: 20/20
OS_BCVA: 20/30-2

## 2025-01-02 ASSESSMENT — TONOMETRY
OD_IOP_MMHG: 16
OS_IOP_MMHG: 16
OD_IOP_MMHG: 13
OS_IOP_MMHG: 13

## 2025-05-05 ENCOUNTER — NON-APPOINTMENT (OUTPATIENT)
Age: 65
End: 2025-05-05

## 2025-05-07 ENCOUNTER — APPOINTMENT (OUTPATIENT)
Dept: NEUROLOGY | Facility: CLINIC | Age: 65
End: 2025-05-07

## 2025-05-07 VITALS
HEIGHT: 68 IN | OXYGEN SATURATION: 96 % | WEIGHT: 209 LBS | SYSTOLIC BLOOD PRESSURE: 122 MMHG | HEART RATE: 92 BPM | BODY MASS INDEX: 31.67 KG/M2 | DIASTOLIC BLOOD PRESSURE: 78 MMHG

## 2025-05-07 DIAGNOSIS — R25.1 TREMOR, UNSPECIFIED: ICD-10-CM

## 2025-05-07 DIAGNOSIS — G25.3 MYOCLONUS: ICD-10-CM

## 2025-05-07 DIAGNOSIS — Z82.49 FAMILY HISTORY OF ISCHEMIC HEART DISEASE AND OTHER DISEASES OF THE CIRCULATORY SYSTEM: ICD-10-CM

## 2025-05-07 PROCEDURE — 99204 OFFICE O/P NEW MOD 45 MIN: CPT

## 2025-05-07 RX ORDER — PROPRANOLOL HYDROCHLORIDE 10 MG/1
10 TABLET ORAL
Qty: 90 | Refills: 2 | Status: ACTIVE | COMMUNITY
Start: 2025-05-07 | End: 1900-01-01

## 2025-05-07 RX ORDER — SILDENAFIL 20 MG/1
TABLET ORAL
Refills: 0 | Status: ACTIVE | COMMUNITY

## 2025-06-18 ENCOUNTER — APPOINTMENT (OUTPATIENT)
Dept: NEUROLOGY | Facility: CLINIC | Age: 65
End: 2025-06-18

## 2025-07-09 ENCOUNTER — OFFICE (OUTPATIENT)
Dept: URBAN - METROPOLITAN AREA CLINIC 12 | Facility: CLINIC | Age: 65
Setting detail: OPHTHALMOLOGY
End: 2025-07-09
Payer: COMMERCIAL

## 2025-07-09 DIAGNOSIS — H20.011: ICD-10-CM

## 2025-07-09 DIAGNOSIS — H40.053: ICD-10-CM

## 2025-07-09 PROCEDURE — 92250 FUNDUS PHOTOGRAPHY W/I&R: CPT | Performed by: STUDENT IN AN ORGANIZED HEALTH CARE EDUCATION/TRAINING PROGRAM

## 2025-07-09 PROCEDURE — 92083 EXTENDED VISUAL FIELD XM: CPT | Performed by: STUDENT IN AN ORGANIZED HEALTH CARE EDUCATION/TRAINING PROGRAM

## 2025-07-09 PROCEDURE — 99214 OFFICE O/P EST MOD 30 MIN: CPT | Performed by: STUDENT IN AN ORGANIZED HEALTH CARE EDUCATION/TRAINING PROGRAM

## 2025-07-09 ASSESSMENT — REFRACTION_AUTOREFRACTION
OS_AXIS: 087
OS_SPHERE: -0.25
OS_CYLINDER: -0.25
OD_AXIS: 125
OD_CYLINDER: -1.25
OD_SPHERE: +0.25

## 2025-07-09 ASSESSMENT — VISUAL ACUITY
OS_BCVA: 20/20-2
OD_BCVA: 20/20-2

## 2025-07-09 ASSESSMENT — CONFRONTATIONAL VISUAL FIELD TEST (CVF)
OS_FINDINGS: FULL
OD_FINDINGS: FULL

## 2025-07-09 ASSESSMENT — KERATOMETRY
OS_K1POWER_DIOPTERS: 44.00
OS_AXISANGLE_DEGREES: 079
OS_K2POWER_DIOPTERS: 44.25
OD_AXISANGLE_DEGREES: 048
OD_K2POWER_DIOPTERS: 44.25
OD_K1POWER_DIOPTERS: 43.25

## 2025-07-20 ENCOUNTER — OFFICE (OUTPATIENT)
Dept: URBAN - METROPOLITAN AREA CLINIC 12 | Facility: CLINIC | Age: 65
Setting detail: OPHTHALMOLOGY
End: 2025-07-20
Payer: COMMERCIAL

## 2025-07-20 ENCOUNTER — RX ONLY (RX ONLY)
Age: 65
End: 2025-07-20

## 2025-07-20 DIAGNOSIS — H40.1111: ICD-10-CM

## 2025-07-20 PROBLEM — H20.00 IRITIS ACUTE: Status: ACTIVE | Noted: 2025-07-20

## 2025-07-20 PROBLEM — H40.052 OCULAR HYPERTENSION; LEFT EYE: Status: ACTIVE | Noted: 2025-07-20

## 2025-07-20 PROCEDURE — 99213 OFFICE O/P EST LOW 20 MIN: CPT | Performed by: STUDENT IN AN ORGANIZED HEALTH CARE EDUCATION/TRAINING PROGRAM

## 2025-07-20 PROCEDURE — 92133 CPTRZD OPH DX IMG PST SGM ON: CPT | Performed by: STUDENT IN AN ORGANIZED HEALTH CARE EDUCATION/TRAINING PROGRAM

## 2025-07-20 ASSESSMENT — REFRACTION_MANIFEST
OD_ADD: +2.50
OS_ADD: +2.50
OS_SPHERE: PLANO

## 2025-07-20 ASSESSMENT — REFRACTION_AUTOREFRACTION
OD_CYLINDER: -0.50
OS_SPHERE: PLANO
OD_AXIS: 154
OS_CYLINDER: -0.25
OD_SPHERE: +0.25
OS_AXIS: 085

## 2025-07-20 ASSESSMENT — TONOMETRY
OS_IOP_MMHG: 14
OD_IOP_MMHG: 13

## 2025-07-20 ASSESSMENT — CONFRONTATIONAL VISUAL FIELD TEST (CVF)
OS_FINDINGS: FULL
OD_FINDINGS: FULL

## 2025-07-20 ASSESSMENT — KERATOMETRY
OD_K2POWER_DIOPTERS: 44.25
OS_AXISANGLE_DEGREES: 081
OD_AXISANGLE_DEGREES: 086
OS_K1POWER_DIOPTERS: 44.00
OD_K1POWER_DIOPTERS: 43.25
OS_K2POWER_DIOPTERS: 44.25

## 2025-07-20 ASSESSMENT — VISUAL ACUITY
OD_BCVA: 20/20-
OS_BCVA: 20/25-3

## 2025-08-04 ENCOUNTER — RX RENEWAL (OUTPATIENT)
Age: 65
End: 2025-08-04